# Patient Record
Sex: FEMALE | Race: WHITE | NOT HISPANIC OR LATINO | Employment: OTHER
[De-identification: names, ages, dates, MRNs, and addresses within clinical notes are randomized per-mention and may not be internally consistent; named-entity substitution may affect disease eponyms.]

---

## 2017-05-15 DIAGNOSIS — I10 BENIGN ESSENTIAL HYPERTENSION: ICD-10-CM

## 2017-05-15 NOTE — TELEPHONE ENCOUNTER
metoprolol (LOPRESSOR) 25 MG tablet      Last Written Prescription Date: 8/15/16  Last Fill Quantity: 180, # refills: 1    Last Office Visit with G, [facility] or [facility] prescribing provider:  5/20/16   Future Office Visit:        BP Readings from Last 3 Encounters:   05/20/16 139/90   12/23/15 142/88   10/19/15 (!) 129/94

## 2017-05-19 RX ORDER — METOPROLOL TARTRATE 25 MG/1
TABLET, FILM COATED ORAL
Qty: 60 TABLET | Refills: 0 | Status: SHIPPED | OUTPATIENT
Start: 2017-05-19 | End: 2017-07-21

## 2017-05-23 NOTE — TELEPHONE ENCOUNTER
Left detailed message as directed below and when called please schedule office visit. LUISA Barahona

## 2017-06-23 ENCOUNTER — HEALTH MAINTENANCE LETTER (OUTPATIENT)
Age: 37
End: 2017-06-23

## 2017-07-21 DIAGNOSIS — I10 BENIGN ESSENTIAL HYPERTENSION: ICD-10-CM

## 2017-07-21 RX ORDER — METOPROLOL TARTRATE 25 MG/1
TABLET, FILM COATED ORAL
Qty: 20 TABLET | Refills: 0 | Status: SHIPPED | OUTPATIENT
Start: 2017-07-21 | End: 2017-09-20

## 2017-07-21 NOTE — TELEPHONE ENCOUNTER
Routing refill request to provider for review/approval because:  Kylah given x1 and patient did not follow up, please advise.  No future appts noted at this time.        Alana Malcolm RN  [facility]

## 2017-07-21 NOTE — TELEPHONE ENCOUNTER
metoprolol (LOPRESSOR) 25 MG tablet      Last Written Prescription Date: 5/19/17  Last Fill Quantity: 60, # refills: 0    Last Office Visit with G, P or [facility] prescribing provider:  5/20/16   Future Office Visit:        BP Readings from Last 3 Encounters:   05/20/16 139/90   12/23/15 142/88   10/19/15 (!) 129/94

## 2017-09-20 ENCOUNTER — OFFICE VISIT (OUTPATIENT)
Dept: FAMILY MEDICINE | Facility: CLINIC | Age: 37
End: 2017-09-20
Payer: COMMERCIAL

## 2017-09-20 VITALS
DIASTOLIC BLOOD PRESSURE: 93 MMHG | BODY MASS INDEX: 37.45 KG/M2 | HEART RATE: 96 BPM | TEMPERATURE: 98.1 F | HEIGHT: 66 IN | SYSTOLIC BLOOD PRESSURE: 148 MMHG | WEIGHT: 233 LBS

## 2017-09-20 DIAGNOSIS — I10 BENIGN ESSENTIAL HYPERTENSION: ICD-10-CM

## 2017-09-20 DIAGNOSIS — R87.810 CERVICAL HIGH RISK HPV (HUMAN PAPILLOMAVIRUS) TEST POSITIVE: ICD-10-CM

## 2017-09-20 DIAGNOSIS — Z00.00 ROUTINE GENERAL MEDICAL EXAMINATION AT A HEALTH CARE FACILITY: Primary | ICD-10-CM

## 2017-09-20 DIAGNOSIS — Z23 NEED FOR PROPHYLACTIC VACCINATION AND INOCULATION AGAINST INFLUENZA: ICD-10-CM

## 2017-09-20 LAB
ANION GAP SERPL CALCULATED.3IONS-SCNC: 9 MMOL/L (ref 3–14)
BUN SERPL-MCNC: 11 MG/DL (ref 7–30)
CALCIUM SERPL-MCNC: 8.9 MG/DL (ref 8.5–10.1)
CHLORIDE SERPL-SCNC: 107 MMOL/L (ref 94–109)
CHOLEST SERPL-MCNC: 236 MG/DL
CO2 SERPL-SCNC: 23 MMOL/L (ref 20–32)
CREAT SERPL-MCNC: 0.8 MG/DL (ref 0.52–1.04)
GFR SERPL CREATININE-BSD FRML MDRD: 80 ML/MIN/1.7M2
GLUCOSE SERPL-MCNC: 111 MG/DL (ref 70–99)
HDLC SERPL-MCNC: 56 MG/DL
LDLC SERPL CALC-MCNC: 141 MG/DL
NONHDLC SERPL-MCNC: 180 MG/DL
POTASSIUM SERPL-SCNC: 4 MMOL/L (ref 3.4–5.3)
SODIUM SERPL-SCNC: 139 MMOL/L (ref 133–144)
TRIGL SERPL-MCNC: 197 MG/DL

## 2017-09-20 PROCEDURE — 87624 HPV HI-RISK TYP POOLED RSLT: CPT | Performed by: FAMILY MEDICINE

## 2017-09-20 PROCEDURE — 80061 LIPID PANEL: CPT | Performed by: FAMILY MEDICINE

## 2017-09-20 PROCEDURE — 80048 BASIC METABOLIC PNL TOTAL CA: CPT | Performed by: FAMILY MEDICINE

## 2017-09-20 PROCEDURE — 88142 CYTOPATH C/V THIN LAYER: CPT | Performed by: FAMILY MEDICINE

## 2017-09-20 PROCEDURE — 36415 COLL VENOUS BLD VENIPUNCTURE: CPT | Performed by: FAMILY MEDICINE

## 2017-09-20 PROCEDURE — 99395 PREV VISIT EST AGE 18-39: CPT | Mod: 25 | Performed by: FAMILY MEDICINE

## 2017-09-20 PROCEDURE — 90471 IMMUNIZATION ADMIN: CPT | Performed by: FAMILY MEDICINE

## 2017-09-20 PROCEDURE — 88141 CYTOPATH C/V INTERPRET: CPT | Performed by: FAMILY MEDICINE

## 2017-09-20 PROCEDURE — 99213 OFFICE O/P EST LOW 20 MIN: CPT | Mod: 25 | Performed by: FAMILY MEDICINE

## 2017-09-20 PROCEDURE — 90686 IIV4 VACC NO PRSV 0.5 ML IM: CPT | Performed by: FAMILY MEDICINE

## 2017-09-20 RX ORDER — METOPROLOL TARTRATE 25 MG/1
TABLET, FILM COATED ORAL
Qty: 90 TABLET | Refills: 1 | Status: SHIPPED | OUTPATIENT
Start: 2017-09-20 | End: 2017-12-29

## 2017-09-20 NOTE — PROGRESS NOTES
SUBJECTIVE:   CC: Afua Galvan is an 37 year old woman who presents for preventive health visit.     Physical   Annual:     Getting at least 3 servings of Calcium per day::  Yes    Bi-annual eye exam::  NO    Dental care twice a year::  NO    Sleep apnea or symptoms of sleep apnea::  None    Frequency of exercise::  2-3 days/week    Duration of exercise::  30-45 minutes    Taking medications regularly::  Yes    Medication side effects::  Not applicable    Additional concerns today::  YES      Today's PHQ-2 Score: PHQ-2 ( 1999 Pf[employer]) 9/19/2017   Q1: Little interest or pleasure in doing things 0   Q2: Feeling down, depressed or hopeless 0   PHQ-2 Score 0   Q1: Little interest or pleasure in doing things Not at all   Q2: Feeling down, depressed or hopeless Not at all   PHQ-2 Score 0       Abuse: Current or Past(Physical, Sexual or Emotional)- no   Do you feel safe in your environment - Yes    Social History   Substance Use Topics     Smoking status: Never Smoker     Smokeless tobacco: Never Used     Alcohol use 0.0 oz/week     0 Standard drinks or equivalent per week     The patient does not drink >3 drinks per day nor >7 drinks per week.    Reviewed orders with patient.  Reviewed health maintenance and updated orders accordingly - Yes  Labs reviewed in EPIC  BP Readings from Last 3 Encounters:   09/20/17 (!) 146/98   05/20/16 139/90   12/23/15 142/88    Wt Readings from Last 3 Encounters:   09/20/17 233 lb (105.7 kg)   05/20/16 217 lb (98.4 kg)   12/23/15 214 lb 8 oz (97.3 kg)                  Current Outpatient Prescriptions   Medication Sig Dispense Refill     metoprolol (LOPRESSOR) 25 MG tablet TAKE 1 TABLET BY MOUTH TWICE A DAY 20 tablet 0     order for DME Equipment being ordered: blood pressure changing kit. 1 Device 0     LORazepam (ATIVAN) 0.5 MG tablet Take 0.5 tablets (0.25 mg) by mouth every 6 hours as needed for anxiety 15 tablet 0     CINNAMON PO Take 500 mg by mouth daily       UNABLE TO FIND  "Tumeric 1 capsule orally daily.       Probiotic Product (PROBIOTIC DAILY PO) Take 1 capsule by mouth daily       Recent Labs   Lab Test  05/20/16   1246  10/13/15   1420  08/05/15   1108   LDL  154*   --    --    HDL  55   --    --    TRIG  181*   --    --    CR   --   0.70  0.82   GFRESTIMATED   --   >90  Non  GFR Calc    78   GFRESTBLACK   --   >90   GFR Calc    >90   GFR Calc     POTASSIUM   --   3.6  3.8   TSH  1.51   --    --             Mammogram not appropriate for this patient based on age.    Pertinent mammograms are reviewed under the imaging tab.  History of abnormal Pap smear: YES - updated in Problem List and Health Maintenance accordingly    Reviewed and updated as needed this visit by clinical staff         Reviewed and updated as needed this visit by Provider          ROS:  C: NEGATIVE for fever, chills, change in weight  I: NEGATIVE for worrisome rashes, moles or lesions  E: NEGATIVE for vision changes or irritation  ENT: NEGATIVE for ear, mouth and throat problems  R: NEGATIVE for significant cough or SOB  B: NEGATIVE for masses, tenderness or discharge  CV: NEGATIVE for chest pain, palpitations or peripheral edema  GI: NEGATIVE for nausea, abdominal pain, heartburn, or change in bowel habits  : NEGATIVE for unusual urinary or vaginal symptoms. Periods are regular.  M: NEGATIVE for significant arthralgias or myalgia  N: NEGATIVE for weakness, dizziness or paresthesias  P: NEGATIVE for changes in mood or affect     OBJECTIVE:   BP (!) 148/93  Pulse 96  Temp 98.1  F (36.7  C) (Oral)  Ht 5' 6\" (1.676 m)  Wt 233 lb (105.7 kg)  BMI 37.61 kg/m2  EXAM:  GENERAL: healthy, alert and no distress  EYES: Eyes grossly normal to inspection, PERRL and conjunctivae and sclerae normal  HENT: ear canals and TM's normal, nose and mouth without ulcers or lesions  NECK: no adenopathy, no asymmetry, masses, or scars and thyroid normal to palpation  RESP: lungs clear " to auscultation - no rales, rhonchi or wheezes  BREAST: normal without masses, tenderness or nipple discharge and no palpable axillary masses or adenopathy  CV: regular rate and rhythm, normal S1 S2, no S3 or S4, no murmur, click or rub, no peripheral edema and peripheral pulses strong  ABDOMEN: soft, nontender, no hepatosplenomegaly, no masses and bowel sounds normal   (female): normal female external genitalia, normal urethral meatus, vaginal mucosa pink, moist, well rugated, and normal cervix/adnexa/uterus without masses or discharge  MS: no gross musculoskeletal defects noted, no edema  SKIN: no suspicious lesions or rashes  NEURO: Normal strength and tone, mentation intact and speech normal  PSYCH: mentation appears normal, affect normal/bright    ASSESSMENT/PLAN:       ICD-10-CM    1. Routine general medical examination at a health care facility Z00.00 Lipid panel reflex to direct LDL     Basic metabolic panel  (Ca, Cl, CO2, Creat, Gluc, K, Na, BUN)   2. Cervical high risk HPV (human papillomavirus) test positive R87.810 Pap imaged thin layer diagnostic with HPV (select HPV order below)     HPV High Risk Types DNA Cervical   3. Benign essential hypertension I10 Basic metabolic panel  (Ca, Cl, CO2, Creat, Gluc, K, Na, BUN)   4. Need for prophylactic vaccination and inoculation against influenza Z23 FLU VAC, SPLIT VIRUS IM > 3 YO (QUADRIVALENT) [93167]     Vaccine Administration, Initial [99939]     Per pt she has BP kit at home and she checks her BP at home it is always less than 140/90s. She takes her metoprolol regularly w/o missing the dose.   Encouraged to monitor at home, f/u if it goes up. Encouraged regular exercise and healthy diet.   Awaiting labs.     COUNSELING:  Reviewed preventive health counseling, as reflected in patient instructions     reports that she has never smoked. She has never used smokeless tobacco.    Estimated body mass index is 34.5 kg/(m^2) as calculated from the following:     "Height as of 5/20/16: 5' 6.5\" (1.689 m).    Weight as of 5/20/16: 217 lb (98.4 kg).       Counseling Resources:  ATP IV Guidelines  Pooled Cohorts Equation Calculator  Breast Cancer Risk Calculator  FRAX Risk Assessment  ICSI Preventive Guidelines  Dietary Guidelines for Americans, 2010  USDA's MyPlate  ASA Prophylaxis  Lung CA Screening    Sakshi Carr MD  [facility]  Answers for HPI/ROS submitted by the patient on 9/19/2017   PHQ-2 Score: 0    "

## 2017-09-20 NOTE — PROGRESS NOTES
Injectable Influenza Immunization Documentation    1.  Is the person to be vaccinated sick today?   No    2. Does the person to be vaccinated have an allergy to a component   of the vaccine?   No    3. Has the person to be vaccinated ever had a serious reaction   to influenza vaccine in the past?   No    4. Has the person to be vaccinated ever had Guillain-Barré syndrome?   No    Form completed by Sheila Em MA

## 2017-09-20 NOTE — NURSING NOTE
"Chief Complaint   Patient presents with     Physical       Initial BP (!) 146/98  Pulse 93  Temp 98.1  F (36.7  C) (Oral)  Ht 5' 6\" (1.676 m)  Wt 233 lb (105.7 kg)  BMI 37.61 kg/m2 Estimated body mass index is 37.61 kg/(m^2) as calculated from the following:    Height as of this encounter: 5' 6\" (1.676 m).    Weight as of this encounter: 233 lb (105.7 kg).  Medication Reconciliation: complete  Sheila Em MA    "

## 2017-09-20 NOTE — MR AVS SNAPSHOT
After Visit Summary   9/20/2017    Afua Galvan    MRN: [id number]           Patient Information     Date Of Birth          1980        Visit Information        Provider Department      9/20/2017 9:00 AM Sakshi Carr MD [facility]        Today's Diagnoses     Routine general medical examination at a health care facility    -  1    Cervical high risk HPV (human papillomavirus) test positive        Benign essential hypertension          Care Instructions      Preventive Health Recommendations  Female Ages 26 - 39  Yearly exam:   See your health care provider every year in order to    Review health changes.     Discuss preventive care.      Review your medicines if you your doctor has prescribed any.    Until age 30: Get a Pap test every three years (more often if you have had an abnormal result).    After age 30: Talk to your doctor about whether you should have a Pap test every 3 years or have a Pap test with HPV screening every 5 years.   You do not need a Pap test if your uterus was removed (hysterectomy) and you have not had cancer.  You should be tested each year for STDs (sexually transmitted diseases), if you're at risk.   Talk to your provider about how often to have your cholesterol checked.  If you are at risk for diabetes, you should have a diabetes test (fasting glucose).  Shots: Get a flu shot each year. Get a tetanus shot every 10 years.   Nutrition:     Eat at least 5 servings of fruits and vegetables each day.    Eat whole-grain bread, whole-wheat pasta and brown rice instead of white grains and rice.    Talk to your provider about Calcium and Vitamin D.     Lifestyle    Exercise at least 150 minutes a week (30 minutes a day, 5 days of the week). This will help you control your weight and prevent disease.    Limit alcohol to one drink per day.    No smoking.     Wear sunscreen to prevent skin cancer.    See your dentist every six months for an  "exam and cleaning.            Follow-ups after your visit        Who to contact     If you have questions or need follow up information about today's clinic visit or your schedule please contact [facility] directly at [phone].  Normal or non-critical lab and imaging results will be communicated to you by MyChart, letter or phone within 4 business days after the clinic has received the results. If you do not hear from us within 7 days, please contact the clinic through [employer] or phone. If you have a critical or abnormal lab result, we will notify you by phone as soon as possible.  Submit refill requests through [employer] or call your pharmacy and they will forward the refill request to us. Please allow 3 business days for your refill to be completed.          Additional Information About Your Visit        [employer] Information     [employer] gives you secure access to your electronic health record. If you see a primary care provider, you can also send messages to your care team and make appointments. If you have questions, please call your primary care clinic.  If you do not have a primary care provider, please call [phone] and they will assist you.        Care EveryWhere ID     This is your Care EveryWhere ID. This could be used by other organizations to access your [place] medical records  [id number]        Your Vitals Were     Pulse Temperature Height BMI (Body Mass Index)          93 98.1  F (36.7  C) (Oral) 5' 6\" (1.676 m) 37.61 kg/m2         Blood Pressure from Last 3 Encounters:   09/20/17 (!) 146/98   05/20/16 139/90   12/23/15 142/88    Weight from Last 3 Encounters:   09/20/17 233 lb (105.7 kg)   05/20/16 217 lb (98.4 kg)   12/23/15 214 lb 8 oz (97.3 kg)              We Performed the Following     Basic metabolic panel  (Ca, Cl, CO2, Creat, Gluc, K, Na, BUN)     HPV High Risk Types DNA Cervical     Lipid panel reflex to direct LDL     Pap imaged thin layer diagnostic with HPV " (select HPV order below)        Primary Care Provider Office Phone #    [facility] [phone]       [address] [place] [id number]        Equal Access to Services     KIA COELLO : Hadii aad ku hadarjuan alberto Shelbiemartín, nidla bobbylev, mane kaaida castillo, moni dominiquein hayaan darvinkhurram berman marino carranza. [facility] [phone].    ATENCIÓN: Si habla español, tiene a mohr disposición servicios gratuitos de asistencia lingüística. Llame al [phone].    We comply with applicable federal civil rights laws and [place] laws. We do not discriminate on the basis of race, color, national origin, age, disability sex, sexual orientation or gender identity.            Thank you!     Thank you for choosing [facility]  for your care. Our goal is always to provide you with excellent care. Hearing back from our patients is one way we can continue to improve our services. Please take a few minutes to complete the written survey that you may receive in the mail after your visit with us. Thank you!             Your Updated Medication List - Protect others around you: Learn how to safely use, store and throw away your medicines at www.disposemymeds.org.          This list is accurate as of: 9/20/17 10:00 AM.  Always use your most recent med list.                   Brand Name Dispense Instructions for use Diagnosis    CINNAMON PO      Take 500 mg by mouth daily        LORazepam 0.5 MG tablet    ATIVAN    15 tablet    Take 0.5 tablets (0.25 mg) by mouth every 6 hours as needed for anxiety    Anxiety       metoprolol 25 MG tablet    LOPRESSOR    20 tablet    TAKE 1 TABLET BY MOUTH TWICE A DAY    Benign essential hypertension       order for DME     1 Device    Equipment being ordered: blood pressure changing kit.    Benign essential hypertension       PROBIOTIC DAILY PO      Take 1 capsule by mouth daily        UNABLE TO FIND      Tumeric 1 capsule orally daily.

## 2017-09-21 ENCOUNTER — MYC MEDICAL ADVICE (OUTPATIENT)
Dept: FAMILY MEDICINE | Facility: CLINIC | Age: 37
End: 2017-09-21

## 2017-09-21 DIAGNOSIS — L81.9 HYPERPIGMENTED SKIN LESION: Primary | ICD-10-CM

## 2017-09-21 NOTE — PROGRESS NOTES
Vic Galvan,     # Your sodium, potassium, kidney function are normal.     # Fasting glucose level is higher than normal, not in diabetic range though, limit sweets in your diet.     # cholesterol is unchanged from last check. I recommend healthy diet and regular exercise as below to improve cholesterol levels.     1. Eat a healthy diet.  A high amount of saturated fat and cholesterol in food that you eat can increase blood cholesterol.   saturated fat can be found in the following foods: Limit intake of  High-fat cheeses   High-fat cuts of meat   Whole-fat milk and cream   Butter   Ice cream and ice cream products.  Most of the fat that you eat should come from unsaturated sources:   Include:  * Nuts:  Walnuts,  Flaxseed                     * Vegetable oils , Canola oil , Olive oil, Sunflower oil.                     * Fish:trout, herring, and salmon                    *[place].   2. Maintain a healthy weight. Being overweight can increase your cholesterol level. Losing weight can help lower your LDL (bad) cholesterol and total cholesterol level, and raise your HDL (good) cholesterol level.     3. Exercise regularly. Regular physical activity can help lower LDL (bad) cholesterol and raise HDL (good) cholesterol. You should try to be physically active for 2 hours and 30 minutes (150 minutes) each week.       # Regarding your high blood pressure, I want you to check your BP at home at least once a day for next 2 weeks. Keep log of your BP numbers. Follow up with the nurse for BP recheck, bring your BP machine with you during that visit.   I will get back to you once you are seen by the RN.     You can schedule this nurse only visit for BP check by calling [phone].     Let me know if you have any further questions.     Sakshi Carr MD.   Family Physician.  [facility].

## 2017-09-27 LAB
COPATH REPORT: ABNORMAL
PAP: ABNORMAL

## 2017-09-28 PROBLEM — R87.810 ASCUS WITH POSITIVE HIGH RISK HPV CERVICAL: Status: ACTIVE | Noted: 2017-09-28

## 2017-09-28 PROBLEM — R87.610 ASCUS WITH POSITIVE HIGH RISK HPV CERVICAL: Status: ACTIVE | Noted: 2017-09-28

## 2017-09-28 LAB
FINAL DIAGNOSIS: ABNORMAL
HPV HR 12 DNA CVX QL NAA+PROBE: POSITIVE
HPV16 DNA SPEC QL NAA+PROBE: NEGATIVE
HPV18 DNA SPEC QL NAA+PROBE: NEGATIVE
SPECIMEN DESCRIPTION: ABNORMAL

## 2017-10-27 ENCOUNTER — TELEPHONE (OUTPATIENT)
Dept: OBGYN | Facility: CLINIC | Age: 37
End: 2017-10-27

## 2017-10-27 NOTE — TELEPHONE ENCOUNTER
Patient called to schedule a colposcopy.  This was suggested by family practice noted at her recent physical.  Scheduled her with Dr. Funez in  on 11/9/2017.  LMP was 10/26/2017.  She is aware of no unprotected intercourse 10 days prior and no intercourse 24 hours prior to the procedure.  She was advised to take 400 mg Ibuprofen 1 hour prior to her appointment.  All questions were answered.  Bhavani Keene RN

## 2017-11-09 ENCOUNTER — OFFICE VISIT (OUTPATIENT)
Dept: OBGYN | Facility: CLINIC | Age: 37
End: 2017-11-09
Payer: COMMERCIAL

## 2017-11-09 VITALS
BODY MASS INDEX: 38.58 KG/M2 | HEART RATE: 99 BPM | SYSTOLIC BLOOD PRESSURE: 171 MMHG | WEIGHT: 239 LBS | OXYGEN SATURATION: 97 % | DIASTOLIC BLOOD PRESSURE: 93 MMHG

## 2017-11-09 DIAGNOSIS — R87.810 ASCUS WITH POSITIVE HIGH RISK HPV CERVICAL: ICD-10-CM

## 2017-11-09 DIAGNOSIS — R87.610 ASCUS WITH POSITIVE HIGH RISK HPV CERVICAL: ICD-10-CM

## 2017-11-09 PROCEDURE — 99203 OFFICE O/P NEW LOW 30 MIN: CPT | Mod: 25 | Performed by: OBSTETRICS & GYNECOLOGY

## 2017-11-09 PROCEDURE — 88305 TISSUE EXAM BY PATHOLOGIST: CPT | Performed by: OBSTETRICS & GYNECOLOGY

## 2017-11-09 PROCEDURE — 57454 BX/CURETT OF CERVIX W/SCOPE: CPT | Performed by: OBSTETRICS & GYNECOLOGY

## 2017-11-09 NOTE — NURSING NOTE
"Chief Complaint   Patient presents with     Consult     Abnormal papsmear per Dr. Carr     Colposcopy     ASC-US and HPV+ other       Initial BP (!) 171/93 (BP Location: Left arm, Cuff Size: Adult Regular)  Pulse 99  Wt 108.4 kg (239 lb)  LMP 10/26/2017  SpO2 97%  Breastfeeding? No  BMI 38.58 kg/m2 Estimated body mass index is 38.58 kg/(m^2) as calculated from the following:    Height as of 9/20/17: 1.676 m (5' 6\").    Weight as of this encounter: 108.4 kg (239 lb).  Medication Reconciliation: complete   JARROD Littlejohn 11/9/2017         "

## 2017-11-09 NOTE — PROGRESS NOTES
Patient Name: Afua Galvan              Date: 11/9/2017   YOB: 1980                         Age: 37 year old   Phone: [phone] (home) [phone] (work)  ________________________________________________________________________  I have been asked to see Afua in consultation by Dr. Carr  to discuss the pap smear, findings and possible further evaluation.  The patient's pap smear history is as noted:  5/20/16 NIL/+ HR HPV (not 16 or 18). Plan: Cotest in 1 year.   9/20/17 ASCUS, +HR HPV.   I attempted to ensure that the patient was educated regarding the nature of her findings and implications to date.  We reviewed the role of HPV, incidence in the population and the natural history of the infection, and its transmission.  We also reviewed ways to minimize her future risk, the effect of HPV on the cervix and treatment options available, should they be indicated.    The pathophysiology of the cervix, including a discussion of the squamous and columnar cells, metaplasia and dysplasia have been reviewed, drawings, sketches and the pamphlets were reviewed with her.      Patient's last menstrual period was 10/26/2017.  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       Past Surgical History:   Procedure Laterality Date     COMBINED CYSTOSCOPY, RETROGRADES, URETEROSCOPY, INSERT STENT Right 8/10/2015    Procedure: COMBINED CYSTOSCOPY, RETROGRADES, URETEROSCOPY, INSERT  STENT;  Surgeon: Radha Garcia MD;  Location: [facility] OR     LASER HOLMIUM LITHOTRIPSY URETER(S), INSERT STENT, COMBINED Right 10/19/2015    Procedure: COMBINED CYSTOSCOPY, URETEROSCOPY, LASER HOLMIUM LITHOTRIPSY URETER(S), INSERT STENT;  Surgeon: aRdha Garcia MD;  Location: [facility] OR        Outpatient Encounter Prescriptions as of 11/9/2017   Medication Sig Dispense Refill     metoprolol (LOPRESSOR) 25 MG tablet TAKE 1 TABLET BY MOUTH TWICE A DAY 90 tablet 1     order for DME Equipment being ordered: blood pressure changing kit. 1 Device 0     LORazepam (ATIVAN) 0.5 MG tablet Take 0.5 tablets (0.25 mg) by mouth every 6 hours as needed for anxiety 15 tablet 0     CINNAMON PO Take 500 mg by mouth daily       UNABLE TO FIND Tumeric 1 capsule orally daily.       Probiotic Product (PROBIOTIC DAILY PO) Take 1 capsule by mouth daily       No facility-administered encounter medications on file as of 11/9/2017.         Allergies as of 11/09/2017     (No Known Allergies)       Social History     Social History     Marital status: Single     Spouse name: N/A     Number of children: N/A     Years of education: N/A     Social History Main Topics     Smoking status: Never Smoker     Smokeless tobacco: Never Used     Alcohol use 0.0 oz/week     0 Standard drinks or equivalent per week     Drug use: No     Sexual activity: Yes     Partners: Male     Birth control/ protection: Condom     Other Topics Concern     None     Social History Narrative        Family History   Problem Relation Age of Onset     DIABETES No family hx of      Coronary Artery Disease No family hx of      Hypertension No family hx of      Hyperlipidemia No family hx of      CEREBROVASCULAR DISEASE No family hx of      Breast Cancer No family hx of      Colon Cancer No family hx of      Prostate Cancer No family hx of      Other Cancer No family hx of      Depression No family hx of      Anxiety Disorder No family hx of      MENTAL ILLNESS No  family hx of      Substance Abuse No family hx of      Anesthesia Reaction No family hx of      Asthma No family hx of      OSTEOPOROSIS No family hx of      Genetic Disorder No family hx of      Thyroid Disease No family hx of      Obesity No family hx of          Review Of Systems  10 point ROS of systems including Constitutional, Eyes, Respiratory, Cardiovascular, Gastroenterology, Genitourinary, Integumentary, Muscularskeletal, Psychiatric were all negative except for pertinent positives noted in my HPI and in the PMH.      Exam:   BP (!) 171/93 (BP Location: Left arm, Cuff Size: Adult Regular)  Pulse 99  Wt 108.4 kg (239 lb)  LMP 10/26/2017  SpO2 97%  Breastfeeding? No  BMI 38.58 kg/m2  GENERAL:  WNWD female NAD  HEENT: NC/AT, EOMI  Lungs:  Good respiratory effort   SKIN: normal skin turgor  GAIT: Normal  NECK: Symmetrical, no masses noted   VULVA: Normal Genitalia  BUS: Normal  URETHRA:  No hypermobility noted  URETHRAL MEATUS:  No masses noted  VAGINA: Normal mucosa, no discharge  CERVIX: Closed, mobile, no discharge  PERIANAL:  No masses or lesions seen  EXTREMITIES: no clubbing, cyanosis, or edema    Assessment:  ASCUS pap smear  High risk HPV    Plan:  Recommend to Proceed with Colpo  The details of the colposcopic procedure were reviewed, the risks of missed diagnoses, pain, infection, and bleeding.       Lauro Funez MD        Procedure:  Procedure for colposcopy and biopsy has been explained to the patient and consent obtained.    Before the procedure, it was ensured that the patient was educated regarding the nature of her findings and implications to date.  We reviewed the role of HPV and the natural history of the infection.  We also reviewed ways to minimize her future risk, the effect of HPV on the cervix and treatment options available, should they be indicated.    The pathophysiology of the cervix, including a discussion of the squamous and columnar cells, metaplasia and dysplasia have  been reviewed, drawings, sketches and the pamphlets were reviewed with her.  The details of the colposcopic procedure were reviewed, the risks of missed diagnoses, pain, infection, and bleeding.  Questions seemed to be answered before proceeding and the patient then consented to the procedure.     Speculum placed in vagina and excellent visualization of cervix achieved, cervix swabbed  with acetic acid solution.    biopsies taken (including ECC): 2  Hemostasis effected with Silver Nitrate    Findings:  Cervix: no visible lesions and no concerning findings  Vaginal inspection: normal without visible lesions.  Procedure Summary: Patient tolerated procedure well and colposcopy adequate.      Assessment:   ASCUS pap smear and high risk HPV    Plan:  Specimens labelled and sent to pathology.  Will base further treatment on pathology findings.  Post biopsy instructions given to patient and call to discuss Pathology results.    Lauro Funez MD

## 2017-11-09 NOTE — MR AVS SNAPSHOT
After Visit Summary   11/9/2017    Afua Galvan    MRN: [id number]           Patient Information     Date Of Birth          1980        Visit Information        Provider Department      11/9/2017 8:30 AM aLuro Funez MD; PROC RM 1 WOMENS [facility]        Today's Diagnoses     ASCUS with positive high risk HPV cervical           Follow-ups after your visit        Who to contact     If you have questions or need follow up information about today's clinic visit or your schedule please contact [facility] directly at [phone].  Normal or non-critical lab and imaging results will be communicated to you by [employer], letter or phone within 4 business days after the clinic has received the results. If you do not hear from us within 7 days, please contact the clinic through [employer] or phone. If you have a critical or abnormal lab result, we will notify you by phone as soon as possible.  Submit refill requests through [employer] or call your pharmacy and they will forward the refill request to us. Please allow 3 business days for your refill to be completed.          Additional Information About Your Visit        MyChart Information     [employer] gives you secure access to your electronic health record. If you see a primary care provider, you can also send messages to your care team and make appointments. If you have questions, please call your primary care clinic.  If you do not have a primary care provider, please call [phone] and they will assist you.        Care EveryWhere ID     This is your Care EveryWhere ID. This could be used by other organizations to access your [facility] medical records  [id number]        Your Vitals Were     Pulse Last Period Pulse Oximetry Breastfeeding? BMI (Body Mass Index)       99 10/26/2017 97% No 38.58 kg/m2        Blood Pressure from Last 3 Encounters:   11/09/17 (!) 171/93   09/20/17 (!) 148/93   05/20/16 139/90     Weight from Last 3 Encounters:   11/09/17 108.4 kg (239 lb)   09/20/17 105.7 kg (233 lb)   05/20/16 98.4 kg (217 lb)              We Performed the Following     COLP CERVIX/UPPER VAGINA     Surgical pathology exam        Primary Care Provider Office Phone # Fax #    [facility] [phone]       [address] [place] [address]        Equal Access to Services     KIA COELLO : Hadii aad ku hadasho Soomaali, waaxda luqadaha, qaybta kaalmada adeegyada, waxay idiin hayaan harish mancinimarquesjerod carranza. So [facility] [phone].    ATENCIÓN: Si habla español, tiene a mohr disposición servicios gratuitos de asistencia lingüística. Llame al [phone].    We comply with applicable federal civil rights laws and [place] laws. We do not discriminate on the basis of race, color, national origin, age, disability, sex, sexual orientation, or gender identity.            Thank you!     Thank you for choosing [facility]  for your care. Our goal is always to provide you with excellent care. Hearing back from our patients is one way we can continue to improve our services. Please take a few minutes to complete the written survey that you may receive in the mail after your visit with us. Thank you!             Your Updated Medication List - Protect others around you: Learn how to safely use, store and throw away your medicines at www.disposemymeds.org.          This list is accurate as of: 11/9/17  9:41 AM.  Always use your most recent med list.                   Brand Name Dispense Instructions for use Diagnosis    CINNAMON PO      Take 500 mg by mouth daily        LORazepam 0.5 MG tablet    ATIVAN    15 tablet    Take 0.5 tablets (0.25 mg) by mouth every 6 hours as needed for anxiety    Anxiety       metoprolol 25 MG tablet    LOPRESSOR    90 tablet    TAKE 1 TABLET BY MOUTH TWICE A DAY    Benign essential hypertension       order for DME     1 Device    Equipment being  ordered: blood pressure changing kit.    Benign essential hypertension       PROBIOTIC DAILY PO      Take 1 capsule by mouth daily        UNABLE TO FIND      Tumeric 1 capsule orally daily.

## 2017-11-13 LAB — COPATH REPORT: NORMAL

## 2017-12-29 ENCOUNTER — TELEPHONE (OUTPATIENT)
Dept: FAMILY MEDICINE | Facility: CLINIC | Age: 37
End: 2017-12-29

## 2017-12-29 DIAGNOSIS — I10 BENIGN ESSENTIAL HYPERTENSION: ICD-10-CM

## 2018-01-02 RX ORDER — METOPROLOL TARTRATE 25 MG/1
TABLET, FILM COATED ORAL
Qty: 60 TABLET | Refills: 0 | Status: SHIPPED | OUTPATIENT
Start: 2018-01-02 | End: 2018-01-31

## 2018-01-02 NOTE — TELEPHONE ENCOUNTER
Requested Prescriptions   Pending Prescriptions Disp Refills     metoprolol (LOPRESSOR) 25 MG tablet [Pharmacy Med Name: METOPROLOL TARTRATE 25 MG TAB] 90 tablet 1        Last Written Prescription Date:  9/20/17  Last Fill Quantity: 90,  # refills: 0   Last Office Visit with G, P or [facility] prescribing provider:  9/20/17   Future Office Visit:      Sig: TAKE 1 TABLET BY MOUTH TWICE A DAY    Beta-Blockers Protocol Failed    12/29/2017  6:14 PM       Failed - Blood pressure under 140/90    BP Readings from Last 3 Encounters:   11/09/17 (!) 171/93   09/20/17 (!) 148/93   05/20/16 139/90                Passed - Patient is age 6 or older       Passed - Recent or future visit with authorizing provider's specialty    Patient had office visit in the last year or has a visit in the next 30 days with authorizing provider.  See chart review.               See plan at 9/20/17 visit:    # Regarding your high blood pressure, I want you to check your BP at home at least once a day for next 2 weeks. Keep log of your BP numbers. Follow up with the nurse for BP recheck, bring your BP machine with you during that visit.   I will get back to you once you are seen by the RN.      You can schedule this nurse only visit for BP check by calling [phone].      Let me know if you have any further questions.      Sakshi Carr MD.   Family Physician.  [facility].       Did not see RN nor bring in home readings nor have home monitor checked.    Medication is being filled for 1 time refill only due to:  Patient needs to be seen because due for RN visit, check home cuff.     Encounter routed to team to reach out to patient to schedule patient for RN visit, patient to bring home cuff and home readings to that visit.    Maren Gan RN  [facility]

## 2018-01-02 NOTE — TELEPHONE ENCOUNTER
Did not see RN nor bring in home readings nor have home monitor checked.    Medication is being filled for 1 time refill only due to:  Patient needs to be seen because due for RN visit, check home cuff.     Encounter routed to team to reach out to patient to schedule patient for RN visit, patient to bring home cuff and home readings to that visit.    Maren Gan, RN  [facility]

## 2018-01-31 DIAGNOSIS — I10 BENIGN ESSENTIAL HYPERTENSION: ICD-10-CM

## 2018-01-31 NOTE — TELEPHONE ENCOUNTER
"Requested Prescriptions   Pending Prescriptions Disp Refills     metoprolol tartrate (LOPRESSOR) 25 MG tablet [Pharmacy Med Name: METOPROLOL TARTRATE 25 MG TAB] 60 tablet 0    Last Written Prescription Date:  1/2/18  Last Fill Quantity: 60,  # refills: 0   Last Office Visit with [facility] provider:  9/20/17   Future Office Visit:      Sig: TAKE 1 TABLET BY MOUTH TWICE A DAY    Beta-Blockers Protocol Failed    1/31/2018 11:06 AM       Failed - Blood pressure under 140/90    BP Readings from Last 3 Encounters:   11/09/17 (!) 171/93   09/20/17 (!) 148/93   05/20/16 139/90                Passed - Patient is age 6 or older       Passed - Recent or future visit with authorizing provider's specialty    Patient had office visit in the last year or has a visit in the next 30 days with authorizing provider.  See \"Patient Info\" tab in inbasket, or \"Choose Columns\" in Meds & Orders section of the refill encounter.               "

## 2018-02-01 NOTE — TELEPHONE ENCOUNTER
Routing refill request to provider for review/approval because:  Patient needs to be seen because:  Last OV notes:    # Regarding your high blood pressure, I want you to check your BP at home at least once a day for next 2 weeks. Keep log of your BP numbers. Follow up with the nurse for BP recheck, bring your BP machine with you during that visit.   I will get back to you once you are seen by the RN.      Patient has not followed up.    Radha Mina, RN [facility] Triage.

## 2018-02-02 RX ORDER — METOPROLOL TARTRATE 25 MG/1
TABLET, FILM COATED ORAL
Qty: 30 TABLET | Refills: 0 | Status: SHIPPED | OUTPATIENT
Start: 2018-02-02 | End: 2018-02-16

## 2018-02-02 NOTE — TELEPHONE ENCOUNTER
30 pills approved, pt needs to be seen for BP recheck.     Sakshi Carr MD.   Family Physician.  [facility].

## 2018-02-16 ENCOUNTER — TELEPHONE (OUTPATIENT)
Dept: FAMILY MEDICINE | Facility: CLINIC | Age: 38
End: 2018-02-16

## 2018-02-16 DIAGNOSIS — I10 BENIGN ESSENTIAL HYPERTENSION: ICD-10-CM

## 2018-02-16 RX ORDER — METOPROLOL TARTRATE 25 MG/1
TABLET, FILM COATED ORAL
Qty: 30 TABLET | Refills: 0 | Status: SHIPPED | OUTPATIENT
Start: 2018-02-16 | End: 2018-03-03

## 2018-02-16 NOTE — TELEPHONE ENCOUNTER
"Requested Prescriptions   Pending Prescriptions Disp Refills     metoprolol tartrate (LOPRESSOR) 25 MG tablet [Pharmacy Med Name: METOPROLOL TARTRATE 25 MG TAB] 30 tablet 0    Last Written Prescription Date:  2-2-18  Last Fill Quantity: 30,  # refills: 0   Last office visit: 9/20/2017 with prescribing provider:     Future Office Visit:     Sig: TAKE 1 TABLET BY MOUTH TWICE A DAY    Beta-Blockers Protocol Failed    2/16/2018  9:43 AM       Failed - Blood pressure under 140/90 in past 12 months    BP Readings from Last 3 Encounters:   11/09/17 (!) 171/93   09/20/17 (!) 148/93   05/20/16 139/90                Passed - Patient is age 6 or older       Passed - Recent or future visit with authorizing provider's specialty    Patient had office visit in the last year or has a visit in the next 30 days with authorizing provider.  See \"Patient Info\" tab in inbasket, or \"Choose Columns\" in Meds & Orders section of the refill encounter.               "

## 2018-02-16 NOTE — TELEPHONE ENCOUNTER
30 pills approved. Pt needs to see RN as recommended.     Sakshi Carr MD.   Family Physician.  [facility].

## 2018-02-16 NOTE — LETTER
Vic Caal,          Your Care Team has attempted to reach you multiple times regarding a recent refill request for your metoprolol tartrate. A one month refill has been provided at this time. You were advised at your office visit on 09/20/17 to follow up with the clinic nurse for a blood pressure recheck. You were advised to check your blood pressure at home at least once a day for 2 weeks and bring that log with to your appointment. They also want you to bring your blood pressure machine with to the appointment. Please contact the clinic to schedule an appointment at [phone].      Sincerely,    Sakshi MYERS

## 2018-02-16 NOTE — TELEPHONE ENCOUNTER
See plan at 9/20/17 visit:    # Regarding your high blood pressure, I want you to check your BP at home at least once a day for next 2 weeks. Keep log of your BP numbers. Follow up with the nurse for BP recheck, bring your BP machine with you during that visit.   I will get back to you once you are seen by the RN.     Routing refill request to provider for review/approval because:  Kylah given x1 and patient did not follow up, please advise    Maren Gan RN  [facility]

## 2018-03-03 DIAGNOSIS — I10 BENIGN ESSENTIAL HYPERTENSION: ICD-10-CM

## 2018-03-05 RX ORDER — METOPROLOL TARTRATE 25 MG/1
TABLET, FILM COATED ORAL
Qty: 30 TABLET | Refills: 0 | Status: SHIPPED | OUTPATIENT
Start: 2018-03-05 | End: 2018-03-18

## 2018-03-05 NOTE — TELEPHONE ENCOUNTER
Routing refill request to provider for review/approval because:  Elevated BP.    Radha Mina RN [facility] Triage.

## 2018-03-05 NOTE — TELEPHONE ENCOUNTER
"Requested Prescriptions   Pending Prescriptions Disp Refills     metoprolol tartrate (LOPRESSOR) 25 MG tablet [Pharmacy Med Name: METOPROLOL TARTRATE 25 MG TAB] 30 tablet 0    Last Written Prescription Date:  2/16/18  Last Fill Quantity: 30,  # refills: 0   Last office visit: 9/20/2017 with prescribing provider:     Future Office Visit:   Next 5 appointments (look out 90 days)     Mar 23, 2018  9:00 AM CDT   [facility] with Sakshi Carr MD   [facility]    [address] [place] [id number]   [phone]                  Sig: TAKE 1 TABLET BY MOUTH TWICE A DAY    Beta-Blockers Protocol Failed    3/3/2018 10:50 AM       Failed - Blood pressure under 140/90 in past 12 months    BP Readings from Last 3 Encounters:   11/09/17 (!) 171/93   09/20/17 (!) 148/93   05/20/16 139/90                Passed - Patient is age 6 or older       Passed - Recent (12 mo) or future (30 days) visit within the authorizing provider's specialty    Patient had office visit in the last year or has a visit in the next 30 days with authorizing provider.  See \"Patient Info\" tab in inbasket, or \"Choose Columns\" in Meds & Orders section of the refill encounter.               "

## 2018-03-05 NOTE — TELEPHONE ENCOUNTER
Refill approved for a month, pt needs to be seen before next refill request.     Sakshi Carr MD.   Family Physician.  [facility].

## 2018-03-18 DIAGNOSIS — I10 BENIGN ESSENTIAL HYPERTENSION: ICD-10-CM

## 2018-03-19 NOTE — TELEPHONE ENCOUNTER
"Requested Prescriptions   Pending Prescriptions Disp Refills     metoprolol tartrate (LOPRESSOR) 25 MG tablet [Pharmacy Med Name: METOPROLOL TARTRATE 25 MG TAB] 30 tablet 0    Last Written Prescription Date:  3/5/18  Last Fill Quantity: 30,  # refills: 0   Last office visit: 9/20/2017 with prescribing provider:     Future Office Visit:   Next 5 appointments (look out 90 days)     Mar 23, 2018  9:00 AM CDT   [facility] MD   [facility]    [address] [place] [id number]   [phone]                  Sig: TAKE 1 TABLET BY MOUTH TWICE A DAY    Beta-Blockers Protocol Failed    3/18/2018  2:07 PM       Failed - Blood pressure under 140/90 in past 12 months    BP Readings from Last 3 Encounters:   11/09/17 (!) 171/93   09/20/17 (!) 148/93   05/20/16 139/90                Passed - Patient is age 6 or older       Passed - Recent (12 mo) or future (30 days) visit within the authorizing provider's specialty    Patient had office visit in the last 12 months or has a visit in the next 30 days with authorizing provider or within the authorizing provider's specialty.  See \"Patient Info\" tab in inbasket, or \"Choose Columns\" in Meds & Orders section of the refill encounter.              "

## 2018-03-21 RX ORDER — METOPROLOL TARTRATE 25 MG/1
TABLET, FILM COATED ORAL
Qty: 15 TABLET | Refills: 0 | Status: SHIPPED | OUTPATIENT
Start: 2018-03-21 | End: 2022-07-23

## 2018-03-21 NOTE — TELEPHONE ENCOUNTER
15 pills approved, needs to be seen, elevated BP/     Sakshi Carr MD.   Family Physician.  [facility].

## 2018-11-16 ENCOUNTER — TELEPHONE (OUTPATIENT)
Dept: OBGYN | Facility: CLINIC | Age: 38
End: 2018-11-16

## 2018-11-16 NOTE — TELEPHONE ENCOUNTER
Pt is past due for Pap follow up  Reminder letter has been sent  LMTC her clinic with any questions or to schedule    Nneka Mariee,   Pap Tracking

## 2018-11-24 ENCOUNTER — HEALTH MAINTENANCE LETTER (OUTPATIENT)
Age: 38
End: 2018-11-24

## 2019-02-15 ENCOUNTER — HEALTH MAINTENANCE LETTER (OUTPATIENT)
Age: 39
End: 2019-02-15

## 2019-09-28 ENCOUNTER — HEALTH MAINTENANCE LETTER (OUTPATIENT)
Age: 39
End: 2019-09-28

## 2021-01-10 ENCOUNTER — HEALTH MAINTENANCE LETTER (OUTPATIENT)
Age: 41
End: 2021-01-10

## 2021-10-23 ENCOUNTER — HEALTH MAINTENANCE LETTER (OUTPATIENT)
Age: 41
End: 2021-10-23

## 2022-02-12 ENCOUNTER — HEALTH MAINTENANCE LETTER (OUTPATIENT)
Age: 42
End: 2022-02-12

## 2022-03-17 ENCOUNTER — PRE VISIT (OUTPATIENT)
Dept: UROLOGY | Facility: CLINIC | Age: 42
End: 2022-03-17
Payer: COMMERCIAL

## 2022-03-17 NOTE — TELEPHONE ENCOUNTER
Reason for visit: consult      Dx/Hx/Sx: nephrolithiasis     Records/imaging/labs/orders: CT report in epic    At Rooming: video visit     Action 03/18/2022 JTYV 4:02pm   Action Taken [facility] sent a request to [facility] for images to be pushed.      Action 03/21/2022 JTV 12:15pm   Action Taken [facility] sent a 2nd request to [facility] for images to be pushed.      Action 03/23/2022 JTV 9:27am   Action Taken [facility] received and resolved images from [facility].

## 2022-04-08 ENCOUNTER — VIRTUAL VISIT (OUTPATIENT)
Dept: UROLOGY | Facility: CLINIC | Age: 42
End: 2022-04-08
Payer: COMMERCIAL

## 2022-04-08 DIAGNOSIS — N20.0 KIDNEY STONE: Primary | ICD-10-CM

## 2022-04-08 PROCEDURE — 99203 OFFICE O/P NEW LOW 30 MIN: CPT | Mod: 95 | Performed by: NURSE PRACTITIONER

## 2022-04-08 RX ORDER — LOSARTAN POTASSIUM 50 MG/1
TABLET ORAL
COMMUNITY
Start: 2022-02-02 | End: 2022-07-23

## 2022-04-08 NOTE — NURSING NOTE
Chief Complaint   Patient presents with     Consult For     kidney stone, not sure if she has it anymore        not currently breastfeeding. There is no height or weight on file to calculate BMI.    Patient Active Problem List   Diagnosis     Calculus of right kidney     Xanthogranulomatous pyelonephritis     Anemia     Benign essential hypertension     Non morbid obesity due to excess calories     Cervical high risk HPV (human papillomavirus) test positive     ASCUS with positive high risk HPV cervical       No Known Allergies    Current Outpatient Medications   Medication Sig Dispense Refill     losartan (COZAAR) 50 MG tablet        CINNAMON PO Take 500 mg by mouth daily       LORazepam (ATIVAN) 0.5 MG tablet Take 0.5 tablets (0.25 mg) by mouth every 6 hours as needed for anxiety 15 tablet 0     metoprolol tartrate (LOPRESSOR) 25 MG tablet TAKE 1 TABLET BY MOUTH TWICE A DAY (Patient taking differently: 100 mg ) 15 tablet 0     order for DME Equipment being ordered: blood pressure changing kit. 1 Device 0     Probiotic Product (PROBIOTIC DAILY PO) Take 1 capsule by mouth daily       UNABLE TO FIND Tumeric 1 capsule orally daily.         Social History     Tobacco Use     Smoking status: Never Smoker     Smokeless tobacco: Never Used   Substance Use Topics     Alcohol use: Yes     Alcohol/week: 0.0 standard drinks     Drug use: No       Cindy Correa  4/8/2022  2:17 PM

## 2022-04-08 NOTE — LETTER
4/8/2022       RE: Afua Glavan  [address]  [facility] [address]     Dear Colleague,    Thank you for referring your patient, Afua Galvan, to the [facility] [place] [facility]. Please see a copy of my visit note below.    Afua is a 42 year old who is being evaluated via a billable video visit.      How would you like to obtain your AVS? MyChart  If the video visit is dropped, the invitation should be resent by: Text to cell phone: [phone]  Will anyone else be joining your video visit? No    Video Start Time: 2:30 PM  Video-Visit Details    Type of service:  Video Visit    Video End Time: 2:55 PM    Originating Location (pt. Location): Home    Distant Location (provider location):  [facility]     Platform used for Video Visit: Chacha Galvan complains of   Chief Complaint   Patient presents with     Consult For     kidney stone, not sure if she has it anymore        Assessment/Plan:  42 year old female with a history of HTN, HLD and kidney stones, with evidence of minimal right renal function in 2015. Much like in 2015, she has a large stone in her right UPJ, as well as in the lower pole of her right kidney, though is unclear if this is having any effect given her minimal kidney function on this side. She is no longer symptomatic like she was last month. We discussed stone treatment today, though this is likely not needed if this kidney has little to no function. She would likely benefit more from a nephrectomy than pursuing any stone removal.   -Will pursue an updated CT A/P now to get a new baseline of her right-sided stone burden to guide surgical planning.     Rochelle Younger, CNP  Department of Urology      Subjective:   42 year old female with a history of HTN, HLD and kidney stones who was seen in the ED last month for right flank pain. CT showed right-sided  stones, with a large stone in the inferior pole of the kidney measuring 2.5 cm, as well as a 1.5 cm stone in the renal pelvis. No ureteral stone, and no left-sided stones.     Per chart review, she previously saw my colleague, Dr. Garcia, for stones in 2015. At that time, she was found to have severe right hydronephrosis, a 1.9 cm lower pole stone, and inflammatory changes concerning for pyelonephritis vs. xanthogranulomatous disease. She underwent stent placement on 8/10/2015 and subsequently underwent a renogram that showed 4% function in the right kidney, 96% in the left. She was counseled on proceeding with a nephrectomy but insisted that endoscopic management be exhausted prior. Therefore, URS was performed on 10/19/15. The stone at her UPJ was noted to be partially-impacted. Lithotripsy was performed. Stone in her lower pole was apparently difficult to visualize due to edema.     Today, she states that she feels much better than she did when she was in the ED last month.       Objective:     Exam:   Patient is a 42 year old female   No vital signs obtained due to virtual visit.  General Appearance: Well groomed, hygenic  Respiratory: No cough, no respiratory distress or labored breathing  Musculoskeletal: Grossly normal with no gross deficits  Skin: No discoloration or apparent rashes  Neurologic: No tremors  Psychiatric: Alert and oriented  Further examination is deferred due to the nature of our visit.

## 2022-04-08 NOTE — PROGRESS NOTES
Afua is a 42 year old who is being evaluated via a billable video visit.      How would you like to obtain your AVS? MyChart  If the video visit is dropped, the invitation should be resent by: Text to cell phone: [phone]  Will anyone else be joining your video visit? No    Video Start Time: 2:30 PM  Video-Visit Details    Type of service:  Video Visit    Video End Time: 2:55 PM    Originating Location (pt. Location): Home    Distant Location (provider location):  [facility] UROLOGY [facility]     Platform used for Video Visit: Chacha Galvan complains of   Chief Complaint   Patient presents with     Consult For     kidney stone, not sure if she has it anymore        Assessment/Plan:  42 year old female with a history of HTN, HLD and kidney stones, with evidence of minimal right renal function in 2015. Much like in 2015, she has a large stone in her right UPJ, as well as in the lower pole of her right kidney, though is unclear if this is having any effect given her minimal kidney function on this side. She is no longer symptomatic like she was last month. We discussed stone treatment today, though this is likely not needed if this kidney has little to no function. She would likely benefit more from a nephrectomy than pursuing any stone removal.   -Will pursue an updated CT A/P now to get a new baseline of her right-sided stone burden to guide surgical planning.     Rochelle Younger, CNP  Department of Urology      Subjective:   42 year old female with a history of HTN, HLD and kidney stones who was seen in the ED last month for right flank pain. CT showed right-sided stones, with a large stone in the inferior pole of the kidney measuring 2.5 cm, as well as a 1.5 cm stone in the renal pelvis. No ureteral stone, and no left-sided stones.     Per chart review, she previously saw my colleague, Dr. Garcia, for stones in 2015. At that time, she was found to have severe right  hydronephrosis, a 1.9 cm lower pole stone, and inflammatory changes concerning for pyelonephritis vs. xanthogranulomatous disease. She underwent stent placement on 8/10/2015 and subsequently underwent a renogram that showed 4% function in the right kidney, 96% in the left. She was counseled on proceeding with a nephrectomy but insisted that endoscopic management be exhausted prior. Therefore, URS was performed on 10/19/15. The stone at her UPJ was noted to be partially-impacted. Lithotripsy was performed. Stone in her lower pole was apparently difficult to visualize due to edema.     Today, she states that she feels much better than she did when she was in the ED last month.       Objective:     Exam:   Patient is a 42 year old female   No vital signs obtained due to virtual visit.  General Appearance: Well groomed, hygenic  Respiratory: No cough, no respiratory distress or labored breathing  Musculoskeletal: Grossly normal with no gross deficits  Skin: No discoloration or apparent rashes  Neurologic: No tremors  Psychiatric: Alert and oriented  Further examination is deferred due to the nature of our visit.

## 2022-04-08 NOTE — PATIENT INSTRUCTIONS
UROLOGY CLINIC VISIT PATIENT INSTRUCTIONS    -Will obtain a repeat CT scan now to reevaluate the stones in your right kidney. To schedule this scan, please call [phone].   -I will be in touch with you regarding results, once completed.     If you have any issues, questions or concerns in the meantime, do not hesitate to contact us at [phone] or via [employer].     Rochelle Younger, CNP  Department of Urology

## 2022-04-13 ENCOUNTER — ANCILLARY PROCEDURE (OUTPATIENT)
Dept: CT IMAGING | Facility: CLINIC | Age: 42
End: 2022-04-13
Attending: NURSE PRACTITIONER
Payer: COMMERCIAL

## 2022-04-13 DIAGNOSIS — N20.0 KIDNEY STONE: ICD-10-CM

## 2022-04-13 PROCEDURE — 74176 CT ABD & PELVIS W/O CONTRAST: CPT | Mod: GC | Performed by: RADIOLOGY

## 2022-04-18 ENCOUNTER — TELEPHONE (OUTPATIENT)
Dept: UROLOGY | Facility: CLINIC | Age: 42
End: 2022-04-18
Payer: COMMERCIAL

## 2022-04-18 DIAGNOSIS — N20.0 KIDNEY STONES: Primary | ICD-10-CM

## 2022-04-18 NOTE — TELEPHONE ENCOUNTER
[facility]    Phone Message    May a detailed message be left on voicemail: yes     Reason for Call: Requesting Results   Name/type of test: CT scan  Date of test: 4/13/22  Was test done at a location other than [facility] (Please fill in the location if not [facility])?: No      Action Taken: Message routed to:  [facility] ([facility] Urology    Travel Screening: Not Applicable

## 2022-04-21 ENCOUNTER — HOSPITAL ENCOUNTER (OUTPATIENT)
Dept: NUCLEAR MEDICINE | Facility: CLINIC | Age: 42
Setting detail: NUCLEAR MEDICINE
Discharge: HOME | End: 2022-04-21
Attending: NURSE PRACTITIONER | Admitting: NURSE PRACTITIONER
Payer: COMMERCIAL

## 2022-04-21 DIAGNOSIS — N20.0 KIDNEY STONES: ICD-10-CM

## 2022-04-21 PROCEDURE — 250N000011 HC RX IP 250 OP 636: Performed by: NURSE PRACTITIONER

## 2022-04-21 PROCEDURE — 78708 K FLOW/FUNCT IMAGE W/DRUG: CPT

## 2022-04-21 PROCEDURE — A9562 TC99M MERTIATIDE: HCPCS | Performed by: NURSE PRACTITIONER

## 2022-04-21 PROCEDURE — 78708 K FLOW/FUNCT IMAGE W/DRUG: CPT | Mod: 26

## 2022-04-21 PROCEDURE — 343N000001 HC RX 343: Performed by: NURSE PRACTITIONER

## 2022-04-21 RX ORDER — FUROSEMIDE 10 MG/ML
20-40 INJECTION INTRAMUSCULAR; INTRAVENOUS ONCE
Status: COMPLETED | OUTPATIENT
Start: 2022-04-21 | End: 2022-04-21

## 2022-04-21 RX ADMIN — TECHNESCAN TC 99M MERTIATIDE 10.1 MILLICURIE: 1 INJECTION, POWDER, LYOPHILIZED, FOR SOLUTION INTRAVENOUS at 12:05

## 2022-04-21 RX ADMIN — FUROSEMIDE 20 MG: 10 INJECTION, SOLUTION INTRAVENOUS at 12:26

## 2022-04-26 ENCOUNTER — MYC MEDICAL ADVICE (OUTPATIENT)
Dept: UROLOGY | Facility: CLINIC | Age: 42
End: 2022-04-26
Payer: COMMERCIAL

## 2022-04-29 ENCOUNTER — PREP FOR PROCEDURE (OUTPATIENT)
Dept: UROLOGY | Facility: CLINIC | Age: 42
End: 2022-04-29

## 2022-04-29 ENCOUNTER — VIRTUAL VISIT (OUTPATIENT)
Dept: UROLOGY | Facility: CLINIC | Age: 42
End: 2022-04-29
Payer: COMMERCIAL

## 2022-04-29 DIAGNOSIS — N20.0 NEPHROLITHIASIS: Primary | ICD-10-CM

## 2022-04-29 DIAGNOSIS — N20.0 KIDNEY STONES: Primary | ICD-10-CM

## 2022-04-29 PROCEDURE — 99214 OFFICE O/P EST MOD 30 MIN: CPT | Mod: 95 | Performed by: UROLOGY

## 2022-04-29 RX ORDER — CIPROFLOXACIN 500 MG/1
500 TABLET, FILM COATED ORAL 2 TIMES DAILY
Qty: 28 TABLET | Refills: 0 | Status: SHIPPED | OUTPATIENT
Start: 2022-04-29 | End: 2022-05-13

## 2022-04-29 RX ORDER — CEFAZOLIN SODIUM 2 G/50ML
2 SOLUTION INTRAVENOUS
Status: CANCELLED | OUTPATIENT
Start: 2022-04-29

## 2022-04-29 RX ORDER — HEPARIN SODIUM 5000 [USP'U]/.5ML
5000 INJECTION, SOLUTION INTRAVENOUS; SUBCUTANEOUS
Status: CANCELLED | OUTPATIENT
Start: 2022-04-29

## 2022-04-29 RX ORDER — CEFAZOLIN SODIUM 2 G/50ML
2 SOLUTION INTRAVENOUS SEE ADMIN INSTRUCTIONS
Status: CANCELLED | OUTPATIENT
Start: 2022-04-29

## 2022-04-29 NOTE — LETTER
4/29/2022       RE: Afua Galvan  [address]  [facility] [address]     Dear Colleague,    Thank you for referring your patient, Afua Galvan, to the [facility] UROLOGY CLINIC [place] at [facility]. Please see a copy of my visit note below.      Urology Clinic    Assessment and Plan  Right kidney stones     8/10/15 Right ureteral stent with Dr Garcia      Right non functional kidney    4/21/22 LASIX RENOGRAM 3%      Urinary tract infection symptoms         The following are complicating factors.  These increase the risk of perioperative complications and make treatment more complicated.    Morbid obesity  BMI 39      We covered surgical risks which include but are not limited to heart attack, stroke, blood clot in the legs or lungs, death, injury to surrounding organs (intestine, liver, spleen, pancreas, lung, muscles, nerves), hernias, loss of sensation around incisions, decreased renal function, and infection.  We discussed the risks of blood transfusion including HIV and hepatitis.  Additional procedures may be necessary in the perioperative period. There are other additional unexpected outcomes which may occur.        Plan    Right robot nephrectomy.    [facility] ([facility] Clinic).    Cipro x 2weeks.      __________________________________________________    HPI  She had kidney stones in 2015.  The treatment for this was really traumatic and she had a urinary tract infection.    Recently her father passed away and she began having right flank pain and a fever.    She is having dysuria      4/21/22 Lasix Renogram   I reviewed the radiologic images and report from this radiologic exam.  My independent interpretation is:    Non functional right kidney    Radiologist Impression  1. Normal functioning left kidney with normal perfusion.  2. Right kidney is nonfunctioning and does not show perfusion  or  excretion.      4/13/22 CT Abdomen/Pelvis without contrast   I reviewed the radiologic images and report from this radiologic exam.  My independent interpretation is:    Multiple large right kidney stones.    Radiologist Impression  1. Multiple large obstructing renal calculi measuring up to 2.3 cm, in  the inferior and interpolar collecting system, source of the right  ureteropelvic junction, with severe hydronephrosis and cortical  atrophy, with perinephric fat stranding. Obstructing stones in the  setting of chronic xanthogranulomatous pyelonephritis.  2. Reactive retroperitoneal lymphadenopathy and inflammatory change.  3. Large pelvic/ovarian cysts suspected. When viewed on the left may  represent a hemorrhagic cyst with some layering internal density. No  adjacent fluid or stranding to suggest ruptured ovarian cyst. Could  consider elective follow-up ultrasound pelvis to better evaluate.    I reviewed the following labs  I reviewed the following laboratory data and went over findings with patient:  Recent Labs   Lab Test 10/13/15  1420 08/05/15  1108   WBC 6.9 9.9   HGB 11.7 8.7*    464*     Recent Labs   Lab Test 09/20/17  1009 10/13/15  1420 08/05/15  1108   CR 0.80 0.70 0.82   GFRESTIMATED 80 >90  Non  GFR Calc   78   GFRESTBLACK >90 >90   GFR Calc   >90   GFR Calc     * 102* 100*     Video-Visit Details  Video Start Time: 141  Video End Time: 206  Time spent on pre-visit work, post visit work, and documentation on day of visit outside of time during video visit: 2 min  Total time on the day of the visit: 27 min  Originating Location (pt. Location): Home.    Distant Location (provider location):  [facility].  Platform used for Video Visit: Kinetic Social  Patient Care Team:  [facility], [facility] as PCP - General (Clinic)  Radha Garcia MD as MD (Urology)  Self, MD Lawrence as Referring  Physician  Radha Garcia MD as MD (Urology)  Rochelle Younger CNP as Nurse Practitioner (Urology)  Rochelle Younger CNP as Assigned Surgical Provider      Copy to patient  FRANTZ CAMRYN MARSH  [address]  [facility] [address]

## 2022-04-29 NOTE — PROGRESS NOTES
"Afua is a 42 year old who is being evaluated via a billable video visit.      How would you like to obtain your AVS? MyChart  If the video visit is dropped, the invitation should be resent by: Text to cell phone: [phone]  Will anyone else be joining your video visit? No  {If patient encounters technical issues they should call [phone] :[id number]}    Video Start Time: {video visit start/end time for provider to select:152948}  Video-Visit Details    Type of service:  Video Visit    Video End Time:{video visit start/end time for provider to select:152948}    Originating Location (pt. Location): {video visit patient location:057234::\"Home\"}    Distant Location (provider location):  [facility] UROLOGY CLINIC [place]     Platform used for Video Visit: {Virtual Visit Platforms:179757::\"e-volo\"}    "

## 2022-04-29 NOTE — PROGRESS NOTES
Urology Clinic    Assessment and Plan  Right kidney stones     8/10/15 Right ureteral stent with Dr Garcia      Right non functional kidney    4/21/22 LASIX RENOGRAM 3%      Urinary tract infection symptoms         The following are complicating factors.  These increase the risk of perioperative complications and make treatment more complicated.    Morbid obesity  BMI 39      We covered surgical risks which include but are not limited to heart attack, stroke, blood clot in the legs or lungs, death, injury to surrounding organs (intestine, liver, spleen, pancreas, lung, muscles, nerves), hernias, loss of sensation around incisions, decreased renal function, and infection.  We discussed the risks of blood transfusion including HIV and hepatitis.  Additional procedures may be necessary in the perioperative period. There are other additional unexpected outcomes which may occur.        Plan    Right robot nephrectomy.    [facility] (PAC Clinic).    Cipro x 2weeks.      __________________________________________________    HPI  She had kidney stones in 2015.  The treatment for this was really traumatic and she had a urinary tract infection.    Recently her father passed away and she began having right flank pain and a fever.    She is having dysuria      4/21/22 Lasix Renogram   I reviewed the radiologic images and report from this radiologic exam.  My independent interpretation is:    Non functional right kidney    Radiologist Impression  1. Normal functioning left kidney with normal perfusion.  2. Right kidney is nonfunctioning and does not show perfusion or  excretion.      4/13/22 CT Abdomen/Pelvis without contrast   I reviewed the radiologic images and report from this radiologic exam.  My independent interpretation is:    Multiple large right kidney stones.    Radiologist Impression  1. Multiple large obstructing renal calculi measuring up to 2.3 cm, in  the inferior and  interpolar collecting system, source of the right  ureteropelvic junction, with severe hydronephrosis and cortical  atrophy, with perinephric fat stranding. Obstructing stones in the  setting of chronic xanthogranulomatous pyelonephritis.  2. Reactive retroperitoneal lymphadenopathy and inflammatory change.  3. Large pelvic/ovarian cysts suspected. When viewed on the left may  represent a hemorrhagic cyst with some layering internal density. No  adjacent fluid or stranding to suggest ruptured ovarian cyst. Could  consider elective follow-up ultrasound pelvis to better evaluate.    I reviewed the following labs  I reviewed the following laboratory data and went over findings with patient:  Recent Labs   Lab Test 10/13/15  1420 08/05/15  1108   WBC 6.9 9.9   HGB 11.7 8.7*    464*     Recent Labs   Lab Test 09/20/17  1009 10/13/15  1420 08/05/15  1108   CR 0.80 0.70 0.82   GFRESTIMATED 80 >90  Non  GFR Calc   78   GFRESTBLACK >90 >90   GFR Calc   >90   GFR Calc     * 102* 100*     Video-Visit Details  Video Start Time: 141  Video End Time: 206  Time spent on pre-visit work, post visit work, and documentation on day of visit outside of time during video visit: 2 min  Total time on the day of the visit: 27 min  Originating Location (pt. Location): Home.    Distant Location (provider location):  [facility].  Platform used for Video Visit: Yeeply Mobile  Patient Care Team:  [facility], [facility] as PCP - General (Clinic)  Radha Garcia MD as MD (Urology)  Self, Referred, MD as Referring Physician  Radha Garcia MD as MD (Urology)  Rochelle Younger CNP as Nurse Practitioner (Urology)  Rochelle Younger CNP as Assigned Surgical Provider      Copy to patient  FRANTZ MARSH  [address] [facility] [address]

## 2022-05-03 ENCOUNTER — MYC MEDICAL ADVICE (OUTPATIENT)
Dept: UROLOGY | Facility: CLINIC | Age: 42
End: 2022-05-03
Payer: COMMERCIAL

## 2022-05-09 ENCOUNTER — OFFICE VISIT (OUTPATIENT)
Dept: FAMILY MEDICINE | Facility: CLINIC | Age: 42
End: 2022-05-09
Payer: COMMERCIAL

## 2022-05-09 VITALS
OXYGEN SATURATION: 98 % | HEIGHT: 67 IN | DIASTOLIC BLOOD PRESSURE: 89 MMHG | BODY MASS INDEX: 36.41 KG/M2 | TEMPERATURE: 98.4 F | HEART RATE: 76 BPM | WEIGHT: 232 LBS | SYSTOLIC BLOOD PRESSURE: 142 MMHG

## 2022-05-09 DIAGNOSIS — Z00.00 HEALTHCARE MAINTENANCE: Primary | ICD-10-CM

## 2022-05-09 DIAGNOSIS — Z76.89 ENCOUNTER TO ESTABLISH CARE: ICD-10-CM

## 2022-05-09 DIAGNOSIS — F41.9 ANXIETY: ICD-10-CM

## 2022-05-09 DIAGNOSIS — E66.01 MORBID OBESITY (H): ICD-10-CM

## 2022-05-09 DIAGNOSIS — N20.0 KIDNEY STONES: ICD-10-CM

## 2022-05-09 LAB
ALBUMIN UR-MCNC: NEGATIVE MG/DL
APPEARANCE UR: CLEAR
BACTERIA #/AREA URNS HPF: ABNORMAL /HPF
BILIRUB UR QL STRIP: NEGATIVE
COLOR UR AUTO: ABNORMAL
GLUCOSE UR STRIP-MCNC: NEGATIVE MG/DL
HGB UR QL STRIP: ABNORMAL
KETONES UR STRIP-MCNC: NEGATIVE MG/DL
LEUKOCYTE ESTERASE UR QL STRIP: NEGATIVE
NITRATE UR QL: NEGATIVE
PH UR STRIP: 6.5 [PH] (ref 5–7)
RBC URINE: <1 /HPF
SP GR UR STRIP: 1 (ref 1–1.03)
SQUAMOUS EPITHELIAL: 1 /HPF
UROBILINOGEN UR STRIP-MCNC: NORMAL MG/DL
WBC URINE: 4 /HPF

## 2022-05-09 PROCEDURE — 81001 URINALYSIS AUTO W/SCOPE: CPT | Performed by: UROLOGY

## 2022-05-09 RX ORDER — LORAZEPAM 0.5 MG/1
0.25 TABLET ORAL EVERY 6 HOURS PRN
Qty: 15 TABLET | Refills: 0 | Status: SHIPPED | OUTPATIENT
Start: 2022-05-09 | End: 2022-07-22

## 2022-05-09 NOTE — NURSING NOTE
"ROOM:1  MINDI NIETO    Preferred Name: Afua     42 year old  Chief Complaint   Patient presents with     kidney infection        Blood pressure (!) 142/89, pulse 76, temperature 98.4  F (36.9  C), temperature source Oral, height 1.702 m (5' 7\"), weight 105.2 kg (232 lb), SpO2 98 %, not currently breastfeeding. Body mass index is 36.34 kg/m .  BP completed using cuff size:    Patient Active Problem List   Diagnosis     Calculus of right kidney     Xanthogranulomatous pyelonephritis     Anemia     Benign essential hypertension     Non morbid obesity due to excess calories     Cervical high risk HPV (human papillomavirus) test positive     ASCUS with positive high risk HPV cervical       Wt Readings from Last 2 Encounters:   05/09/22 105.2 kg (232 lb)   11/09/17 108.4 kg (239 lb)     BP Readings from Last 3 Encounters:   05/09/22 (!) 142/89   11/09/17 (!) 171/93   09/20/17 (!) 148/93       No Known Allergies    Current Outpatient Medications   Medication     ciprofloxacin (CIPRO) 500 MG tablet     LORazepam (ATIVAN) 0.5 MG tablet     losartan (COZAAR) 50 MG tablet     metoprolol tartrate (LOPRESSOR) 25 MG tablet     CINNAMON PO     No current facility-administered medications for this visit.       Social History     Tobacco Use     Smoking status: Never Smoker     Smokeless tobacco: Never Used   Substance Use Topics     Alcohol use: Yes     Alcohol/week: 0.0 standard drinks     Drug use: No       Honoring Choices - Health Care Directive Guide offered to patient at time of visit.    Health Maintenance Due   Topic Date Due     ADVANCE CARE PLANNING  Never done     HIV SCREENING  Never done     HEPATITIS C SCREENING  Never done     DTAP/TDAP/TD IMMUNIZATION (1 - Tdap) 07/31/2014     PREVENTIVE CARE VISIT  09/20/2018     BMP  09/20/2018     HPV TEST  09/27/2020     PAP  09/27/2020       Immunization History   Administered Date(s) Administered     Influenza Vaccine IM > 6 months Valent IIV4 (Alfuria,Fluzone) " 09/20/2017     Tetanus 07/30/2014       Lab Results   Component Value Date    PAP ASC-US 09/20/2017       Recent Labs   Lab Test 09/20/17  1009 05/20/16  1246 10/13/15  1420   * 154*  --    HDL 56 55  --    TRIG 197* 181*  --    CR 0.80  --  0.70   GFRESTIMATED 80  --  >90  Non  GFR Calc     GFRESTBLACK >90  --  >90  African American GFR Calc     POTASSIUM 4.0  --  3.6   TSH  --  1.51  --        PHQ-2 ( 1999 Pfizer) 5/9/2022 4/8/2022   Q1: Little interest or pleasure in doing things 0 1   Q2: Feeling down, depressed or hopeless 0 1   PHQ-2 Score 0 2   Q1: Little interest or pleasure in doing things - Several days   Q2: Feeling down, depressed or hopeless - Several days   PHQ-2 Score - 2       No flowsheet data found.    No flowsheet data found.    No flowsheet data found.    Hakeem Rowley    May 9, 2022 1:10 PM

## 2022-05-09 NOTE — PROGRESS NOTES
Afua Galvan is a 42 year old female who presents today with multiple issues and to establish care.  Her primary concern today is to review labs, other appointments and imaging related to a longstanding need for her to have a right nephrectomy.  Her primary health care had been with [facility], she was diagnosed 7 years ago with a non-functioning right kidney, it appears due to kidney stones and nephrohydrosis, obstructive issues.  Afua has been aware of this, recently she has had right pelvic pain and is currently being treated for a presumed infection per Dr. GALILEO Orellana (4/29/22)  in our system, a urologist.  She has also seen Rochelle Younger in that urology department who confirmed the information with CT and a renogram.      Afua is interested in changing her care team to the [facility] from [facility].  She will consider doing primary care here and working out of this clinic for her various needs.  She is due for an annual health review and exam.      Afua would like to discuss some verbiage she saw when reviewing her CT results from the various sources, specifically related to an ovarian cyst and to what she thought were nodules on a lung view.      Afua's father passed away on March 5, 2022 after a long illness and 3 months in the hospital.  She has experienced anxiety since the age of 22, this has flared with what is going on in her life.  She has used lorazepam 0.5 mg up to twice daily for situational anxiety, she does not feel that she has to be on a daily medication.      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       Review Of Systems  Skin: negative  Eyes: negative  Ears/Nose/Throat: negative  Respiratory: No shortness of breath, dyspnea on exertion, cough, or hemoptysis  Cardiovascular: negative  Gastrointestinal: as above  Genitourinary: as above  Musculoskeletal: negative  Neurologic: negative  Psychiatric: as  above  Hematologic/Lymphatic/Immunologic: negative  Endocrine: negative    Past Medical History:   Diagnosis Date     Anemia      BMI 32.0-32.9,adult 10/12/2015     Calculus of right kidney 10/6/2015     Cervical high risk HPV (human papillomavirus) test positive 5/20/16 5/20/16 NIL/+ HR HPV (not 16 or 18)     Hypertension      Xanthogranulomatous pyelonephritis 10/6/2015     Past Surgical History:   Procedure Laterality Date     COMBINED CYSTOSCOPY, RETROGRADES, URETEROSCOPY, INSERT STENT Right 8/10/2015    Procedure: COMBINED CYSTOSCOPY, RETROGRADES, URETEROSCOPY, INSERT STENT;  Surgeon: Radha Garcia MD;  Location: [facility] OR     LASER HOLMIUM LITHOTRIPSY URETER(S), INSERT STENT, COMBINED Right 10/19/2015    Procedure: COMBINED CYSTOSCOPY, URETEROSCOPY, LASER HOLMIUM LITHOTRIPSY URETER(S), INSERT STENT;  Surgeon: Radha Garcia MD;  Location: [facility] OR     Social History     Socioeconomic History     Marital status: Single     Spouse name: Not on file     Number of children: Not on file     Years of education: Not on file     Highest education level: Not on file   Occupational History     Not on file   Tobacco Use     Smoking status: Never Smoker     Smokeless tobacco: Never Used   Substance and Sexual Activity     Alcohol use: Yes     Comment: 1-2 maybe     Drug use: No     Sexual activity: Not Currently     Partners: Male   Other Topics Concern     Parent/sibling w/ CABG, MI or angioplasty before 65F 55M? Not Asked   Social History Narrative     Not on file     Social Determinants of Health     Financial Resource Strain: Not on file   Food Insecurity: Not on file   Transportation Needs: Not on file   Physical Activity: Not on file   Stress: Not on file   Social Connections: Not on file   Intimate Partner Violence: Not on file   Housing Stability: Not on file     Family History   Problem Relation Age of Onset     Hypertension Mother      Lung Cancer Father      Cancer Father      Diabetes No  "family hx of      Coronary Artery Disease No family hx of      Hyperlipidemia No family hx of      Cerebrovascular Disease No family hx of      Breast Cancer No family hx of      Colon Cancer No family hx of      Prostate Cancer No family hx of      Other Cancer No family hx of      Depression No family hx of      Anxiety Disorder No family hx of      Mental Illness No family hx of      Substance Abuse No family hx of      Anesthesia Reaction No family hx of      Asthma No family hx of      Osteoporosis No family hx of      Genetic Disorder No family hx of      Thyroid Disease No family hx of      Obesity No family hx of        BP (!) 142/89   Pulse 76   Temp 98.4  F (36.9  C) (Oral)   Ht 1.702 m (5' 7\")   Wt 105.2 kg (232 lb)   LMP  (LMP Unknown)   SpO2 98%   BMI 36.34 kg/m      Exam:  Constitutional: healthy, alert and no distress  Psychiatric: mentation appears normal and affect normal/bright    Assessment/Plan:    (Z00.00) Healthcare maintenance  (primary encounter diagnosis)    Plan: Screening Mammogram Digital Bilateral      (N20.0) Kidney stones  Comment: due to her non functioning right kidney and because of the recommendation of her specialists, I encouraged her to consider the surgery and to move in that direction.  She is in the middle of the processes around her father's death, I told Afua that I would contact her urology providers as to timing parameters    (E66.01) Morbid obesity (H)    (Z76.89) Encounter to establish care    I spent 45 minutes with Afua related to her care and reviewed records during the course of her appointment.    Options for treatment and follow-up care were reviewed with the patient. Patient engaged in the decision making process and verbalized understanding of the options discussed and agreed with the final plan.    "

## 2022-05-10 ENCOUNTER — TELEPHONE (OUTPATIENT)
Dept: UROLOGY | Facility: CLINIC | Age: 42
End: 2022-05-10
Payer: COMMERCIAL

## 2022-05-10 NOTE — TELEPHONE ENCOUNTER
Called patient to schedule procedure with Dr. Orellana, there was no answer.      Left message for patient on 5/10/22 to schedule.     Олег Reynoso  Nathalie-Op Coordinator for Urology Surgery    [phone]

## 2022-05-16 ENCOUNTER — PREP FOR PROCEDURE (OUTPATIENT)
Dept: UROLOGY | Facility: CLINIC | Age: 42
End: 2022-05-16
Payer: COMMERCIAL

## 2022-05-17 NOTE — TELEPHONE ENCOUNTER
Called patient to schedule procedure with Dr. Orellana, there was no answer.      Left message for patient on 5/17/22 to schedule, will send patient HelpMeNowhart message.      Олег Reynoso  Nathalie-Op Coordinator for Urology Surgery    [phone]

## 2022-05-20 ENCOUNTER — ANCILLARY PROCEDURE (OUTPATIENT)
Dept: MAMMOGRAPHY | Facility: CLINIC | Age: 42
End: 2022-05-20
Attending: NURSE PRACTITIONER
Payer: COMMERCIAL

## 2022-05-20 DIAGNOSIS — Z00.00 HEALTHCARE MAINTENANCE: ICD-10-CM

## 2022-05-20 PROCEDURE — 77067 SCR MAMMO BI INCL CAD: CPT | Mod: GC | Performed by: STUDENT IN AN ORGANIZED HEALTH CARE EDUCATION/TRAINING PROGRAM

## 2022-05-31 ENCOUNTER — TELEPHONE (OUTPATIENT)
Dept: UROLOGY | Facility: CLINIC | Age: 42
End: 2022-05-31
Payer: COMMERCIAL

## 2022-05-31 NOTE — TELEPHONE ENCOUNTER
Patient is scheduled for procedure with Dr. Orellana     Spoke with: Patient via AOMit     Date of Surgery: Monday August 08, 2022     Location: [place] OR      Informed patient they will need an adult : Yes     Pre-op: Yes      H&P: Patient to schedule with PCP      Pre-procedure COVID-19 Test: Thursday August 04, 2022 at [facility]     Post-op: Friday August 19, 2022    Additional imaging/appointments:     Additional comments:      Surgery packet: Sent via mail 5/31/22    Patient is aware that surgery time is tentative to change and to expect a call 3-1 business days from Pre Admission Nursing for instructions and arrival time

## 2022-06-10 ENCOUNTER — MYC MEDICAL ADVICE (OUTPATIENT)
Dept: UROLOGY | Facility: CLINIC | Age: 42
End: 2022-06-10

## 2022-07-18 ENCOUNTER — MYC MEDICAL ADVICE (OUTPATIENT)
Dept: UROLOGY | Facility: CLINIC | Age: 42
End: 2022-07-18

## 2022-07-19 NOTE — TELEPHONE ENCOUNTER
My Chart.  Kaleigh Cazares LPN  Urology Clinic Service   [facility]

## 2022-07-22 ENCOUNTER — MYC MEDICAL ADVICE (OUTPATIENT)
Dept: FAMILY MEDICINE | Facility: CLINIC | Age: 42
End: 2022-07-22

## 2022-07-22 ENCOUNTER — TELEPHONE (OUTPATIENT)
Dept: UROLOGY | Facility: CLINIC | Age: 42
End: 2022-07-22

## 2022-07-22 ENCOUNTER — TELEPHONE (OUTPATIENT)
Dept: FAMILY MEDICINE | Facility: CLINIC | Age: 42
End: 2022-07-22

## 2022-07-22 DIAGNOSIS — F41.9 ANXIETY: ICD-10-CM

## 2022-07-22 DIAGNOSIS — I10 BENIGN ESSENTIAL HYPERTENSION: Primary | ICD-10-CM

## 2022-07-22 RX ORDER — LORAZEPAM 0.5 MG/1
0.25 TABLET ORAL EVERY 6 HOURS PRN
Qty: 15 TABLET | Refills: 0 | Status: SHIPPED | OUTPATIENT
Start: 2022-07-22 | End: 2024-01-03

## 2022-07-22 NOTE — TELEPHONE ENCOUNTER
Hello, they are a patient of Trista and requesting refills on their medications, is there anyone who can refill it on Trista's behalf?

## 2022-07-22 NOTE — TELEPHONE ENCOUNTER
Franny Belcher, RN  You 3 minutes ago (2:39 PM)     JT    Please let her know that 8/4 would be the day she would have to get her COVID testing done. Thank you      Writer called and spoke to pt. Writer stated Franny's message above and provided fax number to clinic.

## 2022-07-22 NOTE — TELEPHONE ENCOUNTER
[facility] phone call message - patient requesting a refill:    Full Medication Name: Lorazepam 0.5mg, metoprolol tartrate (LOPRESSOR) 100mg,  losartan (COZAAR) 50 MG tablet      Dose: 0.5 mg, 100 mg, 50mg     Pharmacy confirmed as   [id number] - [place], [place] - [address] AT [facility] [address]  [address]  [place] [address]  Phone: [phone] Fax: [phone]  : Yes    Medication tab checked to see if medication has been sent  Yes    Is patient out of medication: No.  1 week left  of the Lopressor and Cozaar and one day left of the Lorazepam days left    Did patient contact the pharmacy? Yes    Additional Comments:      Primary language: English      needed? No    Call taken on July 22, 2022 at 3:06 PM by Hakeem Rowley    Route to P [facility] MED REFILLS TEAM     ++If medication is a controlled substance, please route directly to the provider++

## 2022-07-22 NOTE — TELEPHONE ENCOUNTER
[facility] Call Center    Phone Message    May a detailed message be left on voicemail: yes     Reason for Call: Other: Afua is calling wondering if she went through her PCP to get the UA, preop and covid if she should do 8/2 or 8/4 for her 8/8 surgery with Dr. Orellana, just so she doesn't need to make multiple trips.Please call her back to discuss, thanks!     Action Taken: Message routed to:  [facility] ([facility] uro    Travel Screening: Not Applicable

## 2022-07-23 RX ORDER — METOPROLOL TARTRATE 50 MG
100 TABLET ORAL 2 TIMES DAILY
Qty: 360 TABLET | Refills: 0 | Status: SHIPPED | OUTPATIENT
Start: 2022-07-23 | End: 2022-07-29

## 2022-07-23 RX ORDER — LOSARTAN POTASSIUM 50 MG/1
50 TABLET ORAL DAILY
Qty: 90 TABLET | Refills: 0 | Status: SHIPPED | OUTPATIENT
Start: 2022-07-23 | End: 2022-10-25

## 2022-07-23 NOTE — TELEPHONE ENCOUNTER
I sent in the lorazepam. I sent a message to the patient clarifying her metoprolol and losartan doses.

## 2022-07-26 ENCOUNTER — MYC MEDICAL ADVICE (OUTPATIENT)
Dept: UROLOGY | Facility: CLINIC | Age: 42
End: 2022-07-26

## 2022-07-26 DIAGNOSIS — N20.0 KIDNEY STONES: Primary | ICD-10-CM

## 2022-07-26 DIAGNOSIS — N20.0 NEPHROLITHIASIS: ICD-10-CM

## 2022-07-26 NOTE — TELEPHONE ENCOUNTER
Pt called requesting to speak with Kaleigh stating she does have questions. She is open to speak through mychart or phone, but her mychart would not allow her to reply stating it was an old msg to reply to. Please reach out to pt to discuss. Thanks

## 2022-07-28 ENCOUNTER — NURSE TRIAGE (OUTPATIENT)
Dept: UROLOGY | Facility: CLINIC | Age: 42
End: 2022-07-28

## 2022-07-28 ENCOUNTER — LAB (OUTPATIENT)
Dept: LAB | Facility: CLINIC | Age: 42
End: 2022-07-28
Payer: MEDICAID

## 2022-07-28 DIAGNOSIS — N20.0 NEPHROLITHIASIS: ICD-10-CM

## 2022-07-28 DIAGNOSIS — N20.0 KIDNEY STONES: ICD-10-CM

## 2022-07-28 DIAGNOSIS — N39.0 URINARY TRACT INFECTION: Primary | ICD-10-CM

## 2022-07-28 LAB
ALBUMIN UR-MCNC: 30 MG/DL
APPEARANCE UR: CLEAR
BACTERIA #/AREA URNS HPF: ABNORMAL /HPF
BILIRUB UR QL STRIP: NEGATIVE
COLOR UR AUTO: YELLOW
GLUCOSE UR STRIP-MCNC: NEGATIVE MG/DL
HGB UR QL STRIP: ABNORMAL
KETONES UR STRIP-MCNC: NEGATIVE MG/DL
LEUKOCYTE ESTERASE UR QL STRIP: ABNORMAL
NITRATE UR QL: NEGATIVE
PH UR STRIP: 6 [PH] (ref 5–7)
RBC #/AREA URNS AUTO: ABNORMAL /HPF
SP GR UR STRIP: <=1.005 (ref 1–1.03)
SQUAMOUS #/AREA URNS AUTO: ABNORMAL /LPF
UROBILINOGEN UR STRIP-ACNC: 0.2 E.U./DL
WBC #/AREA URNS AUTO: ABNORMAL /HPF

## 2022-07-28 PROCEDURE — 87086 URINE CULTURE/COLONY COUNT: CPT

## 2022-07-28 PROCEDURE — 81001 URINALYSIS AUTO W/SCOPE: CPT

## 2022-07-28 RX ORDER — SULFAMETHOXAZOLE/TRIMETHOPRIM 800-160 MG
1 TABLET ORAL 2 TIMES DAILY
Qty: 20 TABLET | Refills: 0 | Status: SHIPPED | OUTPATIENT
Start: 2022-07-28 | End: 2024-01-03

## 2022-07-28 NOTE — TELEPHONE ENCOUNTER
Nurse Triage SBAR    Is this a 2nd Level Triage? YES, LICENSED PRACTITIONER REVIEW IS REQUIRED    Situation: Spoke to pt. Pt reports having right flank pain.     Background: Pt is scheduled for right nephrectomy on 8/8/22.   Noted kidney stones present.     Assessment: Pt reports pain started weeks ago and has progressively gotten worse. Pt is currently taking tylenol and ibuprofen. She reports that pain is tolerable enough throughout the day and is able to complete her daily tasks and eat and drink, but at night sweats due to discomfort. Pt has to position self comfortably when pressure is placed to that side. Almost feels like it is bruised when touched. Drinking at least 8 glasses of water a day. No fever. No difficulty with urination. No hematuria. No dysuria. Chart and problems list reviewed.      Protocol Recommended Disposition:   See Within 3 Days In Office     Recommendation: Wait for culture results.   Bactrim DS ordered until surgery date.      Discussed with Dr. Nieto via phone call.     Does the patient meet one of the following criteria for ADS visit consideration? No    Anali Doe RN MSN    Signed Prescriptions:                        Disp   Refills    sulfamethoxazole-trimethoprim (BACTRIM DS)*20 tab*0        Sig: Take 1 tablet by mouth 2 times daily  Authorizing Provider: ARIEL NIETO  Ordering User: ANALI DOE      Reason for Disposition    History of kidney stones    Protocols used: FLANK PAIN-A-OH

## 2022-07-28 NOTE — TELEPHONE ENCOUNTER
Call center called with pt on the line asking about what to do while we wait for culture. Writer transferred call to Anali.

## 2022-07-29 ENCOUNTER — VIRTUAL VISIT (OUTPATIENT)
Dept: FAMILY MEDICINE | Facility: CLINIC | Age: 42
End: 2022-07-29

## 2022-07-29 DIAGNOSIS — R10.11 ABDOMINAL PAIN, RIGHT UPPER QUADRANT: Primary | ICD-10-CM

## 2022-07-29 RX ORDER — METOPROLOL TARTRATE 100 MG
100 TABLET ORAL 2 TIMES DAILY
COMMUNITY
End: 2022-10-25

## 2022-07-29 NOTE — PROGRESS NOTES
Afua is a 42 year old who is being evaluated via a billable video visit.      How would you like to obtain your AVS? MyChart  If the video visit is dropped, the invitation should be resent by: Text to cell phone: [phone]  Will anyone else be joining your video visit? No    Assessment & Plan     Abdominal pain, right upper quadrant  -Discussed the differential for abdominal pain is very broad and is not appropriate for a virtual visit. Pt appears nontoxic and is sitting/talking comfortably. Exam is limited by virtual visit. DDX includes pregnancy, cholelithiasis, cholecystitis, UTI, cholelithiasis among other possibilities. Reassuring pt is NOT having the pain right now, no nausea, vomiting, fever or chills. Discussed red flag symptoms for going to the ED, when to consider seeking urgent care over the weekend. Otherwise if the pain remains intermittently mild to moderate over the weekend, reasonable for pt to wait until in-person on visit on Monday with Francis Bone NP.    Appt scheduled for Monday, Aug 1 in person with Francis Bone NP.    Urvashi Eden, NP  [facility]    Subjective   Afua is a 42 year old presenting for the following health issues:  Cholelithiasis and Abdominal Pain    HPI     RUQ abd pain    Patient presents for a virtual visit for intermittent RUQ and generalized abdominal pain present for the last 2-3 weeks. Pain is 7/10, occurs intermittently, lasts a few minutes.  She is currently being treated for a UTI with bactrim. Started antibiotics yesterday. Not sure if this pain is related to her UTI, gallbladder stones, or kidney stones. Had Cholelithiasis in 2015. In 2016, had some abd pain while in [place]. ED scanned her and found gallstones- provided pain meds but never removed the stones. In March 2022, she developed severe pain again, followed-up with nephrology who found a large stone in UPJ and lower pole of right kidney. She has minimal right kidney function, so nephrology  has scheduled a nephrectomy on 8/8/2022. No increase in pain while eating fatty foods.     She is currently taking ibuprofen and tylenol around the clock for abdominal pain. No fever or chills. Felt flushed a few days ago, but better since starting UTI antibiotic. Mild nausea. No vomiting. Able to tolerate food and fluids. No blood in her stool. No diarrhea. No vaginal bleeding. She is NOT having the pain right now.    Review of Systems   See HPI      Objective       Vitals:  No vitals were obtained today due to virtual visit.    Physical Exam   GENERAL: Healthy, alert and no distress  EYES: Eyes grossly normal to inspection.  No discharge or erythema, or obvious scleral/conjunctival abnormalities.  RESP: No audible wheeze, cough, or visible cyanosis.  No visible retractions or increased work of breathing.    SKIN: Visible skin clear. No significant rash, abnormal pigmentation or lesions.  NEURO: Cranial nerves grossly intact.  Mentation and speech appropriate for age.  PSYCH: Mentation appears normal, affect normal/bright, judgement and insight intact, normal speech and appearance well-groomed.          Video-Visit Details    Video Start Time: 1:52 PM    Type of service:  Video Visit    Video End Time:2:12 PM    Originating Location (pt. Location): Home    Distant Location (provider location):  [facility]     Platform used for Video Visit: Chacha Nolasco

## 2022-07-30 LAB — BACTERIA UR CULT: NORMAL

## 2022-08-01 ENCOUNTER — TELEPHONE (OUTPATIENT)
Dept: UROLOGY | Facility: CLINIC | Age: 42
End: 2022-08-01

## 2022-08-01 DIAGNOSIS — R10.9 FLANK PAIN: Primary | ICD-10-CM

## 2022-08-01 DIAGNOSIS — N20.0 KIDNEY STONE: ICD-10-CM

## 2022-08-01 RX ORDER — OXYCODONE HYDROCHLORIDE 5 MG/1
5 TABLET ORAL EVERY 6 HOURS PRN
Qty: 12 TABLET | Refills: 0 | Status: ON HOLD | OUTPATIENT
Start: 2022-08-01 | End: 2022-08-05

## 2022-08-01 NOTE — TELEPHONE ENCOUNTER
Spoke to pt. Pt reports that her symptoms have improved since starting the antibiotics. She reports that flank pain persists that can be rated up to 8-9/10. She is having difficulty sleeping. Pt is alternating with tylenol and ibuprofen, head and ice. These interventions are helping to manage her pain. Pt confirmed she is able to eat and drink and participate in the daily activities that she needs to do. She is mostly concerned about not being able to take ibuprofen this week in preparation for 8/8/22 surgery.     Spoke to Rochelle Younger NP. Reviewed pt's situation with provider. She will place order for oxycodone for pt to use PRN for breakthrough pain.     Spoke to pt and informed her of order. Also advised pt not to take medication with ativan. Pt states that she's been taking ativan to help take the edge off. Pt understands not to take the medications together due to risk of respiratory concerns. Will call clinic back as needed.     Anali Lisa RN MSN

## 2022-08-01 NOTE — TELEPHONE ENCOUNTER
[facility]    Phone Message    May a detailed message be left on voicemail: yes     Reason for Call: Requesting Results   Name/type of test: UA/UC labs  Date of test: 7/28/22  Was test done at a location other than [facility]?: No    Wanting to know what kind of bacteria it is, so she can be put on the correct antibiotic before her surgery 8/8/22.      Action Taken: Message routed to:  [facility] uro    Travel Screening: Not Applicable

## 2022-08-01 NOTE — TELEPHONE ENCOUNTER
[facility]    Phone Message    May a detailed message be left on voicemail: yes     Reason for Call: Symptoms or Concerns     If patient has red-flag symptoms, warm transfer to triage line    Current symptom or concern: Flank pain. Appt with GP in 45 min.  Per patient, her flank pain has increased. Her UTI symptoms have gone away.  She is not supposed to take advil for one week before surgery.  Surgery scheduled for 8/8/22. Needs directions as she is in a lot of pain.    Symptoms have been present for:  2 week(s)    Has patient previously been seen for this? No      Are there any new or worsening symptoms? Yes: Flank pain      Action Taken: Message routed to:  [facility] Urology    Travel Screening: Not Applicable

## 2022-08-03 NOTE — PROGRESS NOTES
Spoke to pt. Advised pt to go to ER to be further evaluated due to persisting right flank pain. Pt agrees to go.     Anali Lisa RN MSN

## 2022-08-04 ENCOUNTER — HOSPITAL ENCOUNTER (INPATIENT)
Facility: CLINIC | Age: 42
LOS: 1 days | Discharge: HOME | End: 2022-08-05
Attending: UROLOGY | Admitting: UROLOGY
Payer: COMMERCIAL

## 2022-08-04 ENCOUNTER — TELEPHONE (OUTPATIENT)
Dept: UROLOGY | Facility: CLINIC | Age: 42
End: 2022-08-04

## 2022-08-04 DIAGNOSIS — N11.8 XANTHOGRANULOMATOUS PYELONEPHRITIS: Primary | ICD-10-CM

## 2022-08-04 LAB
ANION GAP SERPL CALCULATED.3IONS-SCNC: 10 MMOL/L (ref 7–15)
BUN SERPL-MCNC: 14.2 MG/DL (ref 6–20)
CALCIUM SERPL-MCNC: 9.4 MG/DL (ref 8.6–10)
CHLORIDE SERPL-SCNC: 102 MMOL/L (ref 98–107)
CREAT SERPL-MCNC: 0.76 MG/DL (ref 0.51–0.95)
DEPRECATED HCO3 PLAS-SCNC: 22 MMOL/L (ref 22–29)
ERYTHROCYTE [DISTWIDTH] IN BLOOD BY AUTOMATED COUNT: 15.4 % (ref 10–15)
GFR SERPL CREATININE-BSD FRML MDRD: >90 ML/MIN/1.73M2
GLUCOSE SERPL-MCNC: 122 MG/DL (ref 70–99)
HCT VFR BLD AUTO: 31.9 % (ref 35–47)
HGB BLD-MCNC: 9.7 G/DL (ref 11.7–15.7)
MCH RBC QN AUTO: 23.5 PG (ref 26.5–33)
MCHC RBC AUTO-ENTMCNC: 30.4 G/DL (ref 31.5–36.5)
MCV RBC AUTO: 77 FL (ref 78–100)
PLATELET # BLD AUTO: 406 10E3/UL (ref 150–450)
POTASSIUM SERPL-SCNC: 4.1 MMOL/L (ref 3.4–5.3)
RBC # BLD AUTO: 4.12 10E6/UL (ref 3.8–5.2)
SODIUM SERPL-SCNC: 134 MMOL/L (ref 136–145)
WBC # BLD AUTO: 9.6 10E3/UL (ref 4–11)

## 2022-08-04 PROCEDURE — 250N000013 HC RX MED GY IP 250 OP 250 PS 637: Performed by: STUDENT IN AN ORGANIZED HEALTH CARE EDUCATION/TRAINING PROGRAM

## 2022-08-04 PROCEDURE — 250N000011 HC RX IP 250 OP 636: Performed by: STUDENT IN AN ORGANIZED HEALTH CARE EDUCATION/TRAINING PROGRAM

## 2022-08-04 PROCEDURE — 36415 COLL VENOUS BLD VENIPUNCTURE: CPT | Performed by: STUDENT IN AN ORGANIZED HEALTH CARE EDUCATION/TRAINING PROGRAM

## 2022-08-04 PROCEDURE — 85027 COMPLETE CBC AUTOMATED: CPT | Performed by: STUDENT IN AN ORGANIZED HEALTH CARE EDUCATION/TRAINING PROGRAM

## 2022-08-04 PROCEDURE — 82310 ASSAY OF CALCIUM: CPT | Performed by: STUDENT IN AN ORGANIZED HEALTH CARE EDUCATION/TRAINING PROGRAM

## 2022-08-04 PROCEDURE — 82374 ASSAY BLOOD CARBON DIOXIDE: CPT | Performed by: STUDENT IN AN ORGANIZED HEALTH CARE EDUCATION/TRAINING PROGRAM

## 2022-08-04 PROCEDURE — 120N000002 HC R&B MED SURG/OB UMMC

## 2022-08-04 RX ORDER — LANOLIN ALCOHOL/MO/W.PET/CERES
3 CREAM (GRAM) TOPICAL
Status: DISCONTINUED | OUTPATIENT
Start: 2022-08-04 | End: 2022-08-05 | Stop reason: HOSPADM

## 2022-08-04 RX ORDER — BISACODYL 10 MG
10 SUPPOSITORY, RECTAL RECTAL DAILY PRN
Status: DISCONTINUED | OUTPATIENT
Start: 2022-08-04 | End: 2022-08-05 | Stop reason: HOSPADM

## 2022-08-04 RX ORDER — OXYCODONE HYDROCHLORIDE 5 MG/1
5 TABLET ORAL EVERY 4 HOURS PRN
Status: DISCONTINUED | OUTPATIENT
Start: 2022-08-04 | End: 2022-08-05 | Stop reason: HOSPADM

## 2022-08-04 RX ORDER — NALOXONE HYDROCHLORIDE 0.4 MG/ML
0.4 INJECTION, SOLUTION INTRAMUSCULAR; INTRAVENOUS; SUBCUTANEOUS
Status: DISCONTINUED | OUTPATIENT
Start: 2022-08-04 | End: 2022-08-05 | Stop reason: HOSPADM

## 2022-08-04 RX ORDER — PROCHLORPERAZINE MALEATE 5 MG
10 TABLET ORAL EVERY 6 HOURS PRN
Status: DISCONTINUED | OUTPATIENT
Start: 2022-08-04 | End: 2022-08-05 | Stop reason: HOSPADM

## 2022-08-04 RX ORDER — OXYCODONE HYDROCHLORIDE 10 MG/1
10 TABLET ORAL EVERY 4 HOURS PRN
Status: DISCONTINUED | OUTPATIENT
Start: 2022-08-04 | End: 2022-08-05 | Stop reason: HOSPADM

## 2022-08-04 RX ORDER — ONDANSETRON 2 MG/ML
4 INJECTION INTRAMUSCULAR; INTRAVENOUS EVERY 6 HOURS PRN
Status: DISCONTINUED | OUTPATIENT
Start: 2022-08-04 | End: 2022-08-05 | Stop reason: HOSPADM

## 2022-08-04 RX ORDER — PIPERACILLIN SODIUM, TAZOBACTAM SODIUM 3; .375 G/15ML; G/15ML
3.38 INJECTION, POWDER, LYOPHILIZED, FOR SOLUTION INTRAVENOUS EVERY 6 HOURS
Status: DISCONTINUED | OUTPATIENT
Start: 2022-08-04 | End: 2022-08-05 | Stop reason: HOSPADM

## 2022-08-04 RX ORDER — HYDROXYZINE HYDROCHLORIDE 25 MG/1
25 TABLET, FILM COATED ORAL EVERY 6 HOURS PRN
Status: DISCONTINUED | OUTPATIENT
Start: 2022-08-04 | End: 2022-08-05 | Stop reason: HOSPADM

## 2022-08-04 RX ORDER — METOPROLOL TARTRATE 50 MG
100 TABLET ORAL 2 TIMES DAILY
Status: DISCONTINUED | OUTPATIENT
Start: 2022-08-04 | End: 2022-08-05 | Stop reason: HOSPADM

## 2022-08-04 RX ORDER — CALCIUM CARBONATE 500 MG/1
500 TABLET, CHEWABLE ORAL 4 TIMES DAILY PRN
Status: DISCONTINUED | OUTPATIENT
Start: 2022-08-04 | End: 2022-08-05 | Stop reason: HOSPADM

## 2022-08-04 RX ORDER — NALOXONE HYDROCHLORIDE 0.4 MG/ML
0.2 INJECTION, SOLUTION INTRAMUSCULAR; INTRAVENOUS; SUBCUTANEOUS
Status: DISCONTINUED | OUTPATIENT
Start: 2022-08-04 | End: 2022-08-05 | Stop reason: HOSPADM

## 2022-08-04 RX ORDER — ACETAMINOPHEN 325 MG/1
650 TABLET ORAL EVERY 4 HOURS PRN
Status: DISCONTINUED | OUTPATIENT
Start: 2022-08-07 | End: 2022-08-05

## 2022-08-04 RX ORDER — LOSARTAN POTASSIUM 50 MG/1
50 TABLET ORAL DAILY
Status: DISCONTINUED | OUTPATIENT
Start: 2022-08-05 | End: 2022-08-05 | Stop reason: HOSPADM

## 2022-08-04 RX ORDER — ONDANSETRON 4 MG/1
4 TABLET, ORALLY DISINTEGRATING ORAL EVERY 6 HOURS PRN
Status: DISCONTINUED | OUTPATIENT
Start: 2022-08-04 | End: 2022-08-05 | Stop reason: HOSPADM

## 2022-08-04 RX ORDER — AMOXICILLIN 250 MG
1 CAPSULE ORAL 2 TIMES DAILY
Status: DISCONTINUED | OUTPATIENT
Start: 2022-08-04 | End: 2022-08-04

## 2022-08-04 RX ORDER — POLYETHYLENE GLYCOL 3350 17 G/17G
17 POWDER, FOR SOLUTION ORAL DAILY
Status: DISCONTINUED | OUTPATIENT
Start: 2022-08-05 | End: 2022-08-04

## 2022-08-04 RX ORDER — LIDOCAINE 40 MG/G
CREAM TOPICAL
Status: DISCONTINUED | OUTPATIENT
Start: 2022-08-04 | End: 2022-08-05 | Stop reason: HOSPADM

## 2022-08-04 RX ADMIN — PIPERACILLIN AND TAZOBACTAM 3.38 G: 3; .375 INJECTION, POWDER, LYOPHILIZED, FOR SOLUTION INTRAVENOUS at 23:42

## 2022-08-04 RX ADMIN — OXYCODONE HYDROCHLORIDE 5 MG: 5 TABLET ORAL at 22:39

## 2022-08-04 RX ADMIN — METOPROLOL TARTRATE 100 MG: 50 TABLET, FILM COATED ORAL at 22:39

## 2022-08-04 RX ADMIN — HYDROXYZINE HYDROCHLORIDE 25 MG: 25 TABLET ORAL at 22:18

## 2022-08-04 ASSESSMENT — ACTIVITIES OF DAILY LIVING (ADL)
CONCENTRATING,_REMEMBERING_OR_MAKING_DECISIONS_DIFFICULTY: NO
FALL_HISTORY_WITHIN_LAST_SIX_MONTHS: NO
DOING_ERRANDS_INDEPENDENTLY_DIFFICULTY: NO
DRESSING/BATHING_DIFFICULTY: NO
DIFFICULTY_EATING/SWALLOWING: NO
ADLS_ACUITY_SCORE: 18
WEAR_GLASSES_OR_BLIND: NO
CHANGE_IN_FUNCTIONAL_STATUS_SINCE_ONSET_OF_CURRENT_ILLNESS/INJURY: NO
TOILETING_ISSUES: NO
WALKING_OR_CLIMBING_STAIRS_DIFFICULTY: NO

## 2022-08-05 VITALS
OXYGEN SATURATION: 100 % | SYSTOLIC BLOOD PRESSURE: 129 MMHG | HEART RATE: 74 BPM | RESPIRATION RATE: 16 BRPM | HEIGHT: 67 IN | DIASTOLIC BLOOD PRESSURE: 79 MMHG | WEIGHT: 219 LBS | BODY MASS INDEX: 34.37 KG/M2 | TEMPERATURE: 96.7 F

## 2022-08-05 LAB
ANION GAP SERPL CALCULATED.3IONS-SCNC: 9 MMOL/L (ref 7–15)
BUN SERPL-MCNC: 10.5 MG/DL (ref 6–20)
CALCIUM SERPL-MCNC: 9.1 MG/DL (ref 8.6–10)
CHLORIDE SERPL-SCNC: 104 MMOL/L (ref 98–107)
CREAT SERPL-MCNC: 0.81 MG/DL (ref 0.51–0.95)
DEPRECATED HCO3 PLAS-SCNC: 24 MMOL/L (ref 22–29)
ERYTHROCYTE [DISTWIDTH] IN BLOOD BY AUTOMATED COUNT: 15.8 % (ref 10–15)
GFR SERPL CREATININE-BSD FRML MDRD: >90 ML/MIN/1.73M2
GLUCOSE SERPL-MCNC: 108 MG/DL (ref 70–99)
HCT VFR BLD AUTO: 30.6 % (ref 35–47)
HGB BLD-MCNC: 9 G/DL (ref 11.7–15.7)
MCH RBC QN AUTO: 23.1 PG (ref 26.5–33)
MCHC RBC AUTO-ENTMCNC: 29.4 G/DL (ref 31.5–36.5)
MCV RBC AUTO: 79 FL (ref 78–100)
PLATELET # BLD AUTO: 342 10E3/UL (ref 150–450)
POTASSIUM SERPL-SCNC: 4.1 MMOL/L (ref 3.4–5.3)
RBC # BLD AUTO: 3.9 10E6/UL (ref 3.8–5.2)
SARS-COV-2 RNA RESP QL NAA+PROBE: NEGATIVE
SODIUM SERPL-SCNC: 137 MMOL/L (ref 136–145)
WBC # BLD AUTO: 9.5 10E3/UL (ref 4–11)

## 2022-08-05 PROCEDURE — 250N000011 HC RX IP 250 OP 636: Performed by: STUDENT IN AN ORGANIZED HEALTH CARE EDUCATION/TRAINING PROGRAM

## 2022-08-05 PROCEDURE — 85027 COMPLETE CBC AUTOMATED: CPT | Performed by: STUDENT IN AN ORGANIZED HEALTH CARE EDUCATION/TRAINING PROGRAM

## 2022-08-05 PROCEDURE — 99232 SBSQ HOSP IP/OBS MODERATE 35: CPT | Performed by: NURSE PRACTITIONER

## 2022-08-05 PROCEDURE — 258N000003 HC RX IP 258 OP 636: Performed by: UROLOGY

## 2022-08-05 PROCEDURE — 250N000013 HC RX MED GY IP 250 OP 250 PS 637: Performed by: STUDENT IN AN ORGANIZED HEALTH CARE EDUCATION/TRAINING PROGRAM

## 2022-08-05 PROCEDURE — 93010 ELECTROCARDIOGRAM REPORT: CPT | Performed by: INTERNAL MEDICINE

## 2022-08-05 PROCEDURE — U0005 INFEC AGEN DETEC AMPLI PROBE: HCPCS | Performed by: STUDENT IN AN ORGANIZED HEALTH CARE EDUCATION/TRAINING PROGRAM

## 2022-08-05 PROCEDURE — 250N000011 HC RX IP 250 OP 636: Performed by: UROLOGY

## 2022-08-05 PROCEDURE — 36415 COLL VENOUS BLD VENIPUNCTURE: CPT | Performed by: STUDENT IN AN ORGANIZED HEALTH CARE EDUCATION/TRAINING PROGRAM

## 2022-08-05 PROCEDURE — 93005 ELECTROCARDIOGRAM TRACING: CPT

## 2022-08-05 PROCEDURE — 82310 ASSAY OF CALCIUM: CPT | Performed by: STUDENT IN AN ORGANIZED HEALTH CARE EDUCATION/TRAINING PROGRAM

## 2022-08-05 RX ORDER — ACETAMINOPHEN 325 MG/1
325 TABLET ORAL EVERY 4 HOURS PRN
Status: DISCONTINUED | OUTPATIENT
Start: 2022-08-05 | End: 2022-08-05 | Stop reason: HOSPADM

## 2022-08-05 RX ORDER — CIPROFLOXACIN 500 MG/1
500 TABLET, FILM COATED ORAL 2 TIMES DAILY
Qty: 6 TABLET | Refills: 0 | Status: ON HOLD | OUTPATIENT
Start: 2022-08-05 | End: 2022-08-09

## 2022-08-05 RX ADMIN — METOPROLOL TARTRATE 100 MG: 50 TABLET, FILM COATED ORAL at 08:34

## 2022-08-05 RX ADMIN — ACETAMINOPHEN 325 MG: 325 TABLET, FILM COATED ORAL at 08:33

## 2022-08-05 RX ADMIN — PIPERACILLIN AND TAZOBACTAM 3.38 G: 3; .375 INJECTION, POWDER, LYOPHILIZED, FOR SOLUTION INTRAVENOUS at 05:19

## 2022-08-05 RX ADMIN — VANCOMYCIN HYDROCHLORIDE 1250 MG: 10 INJECTION, POWDER, LYOPHILIZED, FOR SOLUTION INTRAVENOUS at 12:36

## 2022-08-05 RX ADMIN — LOSARTAN POTASSIUM 50 MG: 50 TABLET, FILM COATED ORAL at 08:34

## 2022-08-05 RX ADMIN — VANCOMYCIN HYDROCHLORIDE 2000 MG: 1 INJECTION, POWDER, LYOPHILIZED, FOR SOLUTION INTRAVENOUS at 00:36

## 2022-08-05 RX ADMIN — ACETAMINOPHEN 325 MG: 325 TABLET, FILM COATED ORAL at 02:56

## 2022-08-05 RX ADMIN — OXYCODONE HYDROCHLORIDE 10 MG: 10 TABLET ORAL at 02:56

## 2022-08-05 RX ADMIN — OXYCODONE HYDROCHLORIDE 5 MG: 5 TABLET ORAL at 08:32

## 2022-08-05 RX ADMIN — ACETAMINOPHEN 325 MG: 325 TABLET, FILM COATED ORAL at 14:02

## 2022-08-05 ASSESSMENT — ACTIVITIES OF DAILY LIVING (ADL)
ADLS_ACUITY_SCORE: 18

## 2022-08-05 NOTE — PROGRESS NOTES
"Urology  Progress Note    VIKYON. Transferred from OSH yesterday. Stable overnight. No pain. Sleeping comfortably.     Exam  /66 (BP Location: Right arm)   Pulse 83   Temp 97.5  F (36.4  C) (Oral)   Resp 17   Ht 1.702 m (5' 7\")   Wt 99.3 kg (219 lb)   SpO2 100%   BMI 34.30 kg/m    No acute distress  Unlabored breathing  Abdomen soft, nontender, nondistended.  No CVA tenderness bilaterally     No I/O charted     Labs  WBC 9.6  Hgb 9.7  Cr 0.76    AM labs pending    Assessment/Plan   Afua Galvan is a 42 year old female with PMH of htn, xanthogranulomatous pyelonephritis, who presents as a transfer from [facility], initially presented with significant flank pain, currently on abx for UTI, with concern of newly developed right retroperitoneal abscess. Will have images pushed over to review to determine whether patient requires a drain or other urgent intervention while in-house.      - Tylenol, PRN oxy for pain control  - continue PTA BP medications   - labs pending   - currently on Zosyn, plan to convert to PO regimen prior to discharge    Dispo: pending labs, abx plan, and staff discussion, may discharge to home for weekend prior to surgery on Monday    Seen and examined with the chief resident. Will discuss with Dr. Alonso and Esteban.     Contacting the Urology Team     Please use the following job codes to reach the Urology Team. Note that you must use an in house phone and that job codes cannot receive text pages.     On weekdays, dial [phone] (or star-star-star [phone] on the new [employer] telephones) then [phone] to reach the Adult Urology resident or PA on call    On weekdays, dial [phone] (or star-star-star [phone] on the new [employer] telephones) then [phone] to reach the Pediatric Urology resident    On weeknights and weekends, dial [phone] (or star-star-star [phone] on the new [employer] telephones) then [phone] to reach the Urology resident on call (for both Adult and Pediatrics)        "

## 2022-08-05 NOTE — DISCHARGE SUMMARY
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    Findings: The breasts have scattered areas of fibroglandular density.    There is no significant interval change.     IMPRESSION: ACR BI-RADS Category 1: Negative    RECOMMENDED FOLLOW-UP: Annual routine screening mammogram    The results and recommendations of this examination will be communicated   to the patient.    I have personally reviewed the examination and initial interpretation  and I agree with the findings.            Medications Prior to Admission:     No medications prior to admission.            Discharge Medications:     Discharge Medication List as of 8/5/2022 12:22 PM      START taking these medications    Details   ciprofloxacin (CIPRO) 500 MG tablet Take 1 tablet (500 mg) by mouth 2 times daily for 3 days, Disp-6 tablet, R-0, E-Prescribe         CONTINUE these medications which have NOT CHANGED    Details   LORazepam (ATIVAN) 0.5 MG tablet Take 0.5 tablets (0.25 mg) by mouth every 6 hours as needed for  anxiety, Disp-15 tablet, R-0, E-Prescribe      losartan (COZAAR) 50 MG tablet Take 1 tablet (50 mg) by mouth daily, Disp-90 tablet, R-0, E-Prescribe      metoprolol tartrate (LOPRESSOR) 100 MG tablet Take 100 mg by mouth 2 times daily, Historical      sulfamethoxazole-trimethoprim (BACTRIM DS) 800-160 MG tablet Take 1 tablet by mouth 2 times daily, Disp-20 tablet, R-0, E-Prescribe         STOP taking these medications       oxyCODONE (ROXICODONE) 5 MG tablet Comments:   Reason for Stopping:                      Brief History of Illness:   Afua Galvan is a 42 year old female with PMH of htn, xanthogranulomatous pyelonephritis, who presents as a transfer from [facility], initially presented with significant flank pain, currently on abx for UTI, with concern of newly developed right retroperitoneal abscess.            Hospital Course:   The patient's hospital course was unremarkable. She improved with antibiotics and after review of her images, there was no need for urgent intervention. We will continue with plan for surgery as previous scheduled next week with Dr. Orellana.    Patient was discharged home with appropriate contact information, follow-up and instructions as seen below in the discharge paperwork.         Final Pathology Result:   None         Discharge Instructions and Follow-Up:     Discharge Procedure Orders   Reason for your hospital stay   Order Comments: You were hospitalized given concerns for an infection around your kidney     Activity   Order Comments: Your activity upon discharge: activity as tolerated     Order Specific Question Answer Comments   Is discharge order? Yes      Discharge Instructions   Order Comments: Activity  - No restrictions    Diet  - Regular diet    Medications  - Ciprofloxacin is an antibiotic prescribed before your upcoming surgery. Take up until the night before surgery  - Ok to continue metoprolol through surgery  - Pleas hold losartan the day of surgery.  "Ok to take otherwise.    Follow-Up:  - Follow up on Monday as scheduled for your surgery  - Call or return sooner than your regularly scheduled visit if you develop any of the following: fever (greater than 101.5), uncontrolled pain, uncontrolled nausea or vomiting, or inability to urinate.    Phone numbers:   - Monday through Friday 8am to 4:30pm: Call [phone] with questions, requests for medication refills, or to schedule or confirm an appointment.  - Nights or weekends: call the after hours emergency pager - [phone] and tell the  \"I would like to page the Urology Resident on call.\" Please note, due to prescribing laws, resident physicians are unable to prescribe narcotics after-hours. If you feel as though you will need a refill of a narcotic pain medication, you will need to call the clinic during business hours OR seek emergency care.  - For emergencies, call [phone]     Diet   Order Comments: Follow this diet upon discharge: Orders Placed This Encounter      Regular Diet Adult    Remember to follow the instructions about food and drink consumption before your surgery on Monday 8/8/2022     Order Specific Question Answer Comments   Is discharge order? Yes             Discharge Disposition:     Discharged to Home      Condition at discharge: Good    --    Asim De Jesus MD  Urology Resident    5:11 PM, 8/5/2022    "

## 2022-08-05 NOTE — PROGRESS NOTES
"Antimicrobial Stewardship Team Note    Antimicrobial Stewardship Program - A joint venture between [facility] and  Physicians to optimize antibiotic management.  NOT a formal consult - Restricted Antimicrobial Review     Patient: Afua Galvan  MRN: [id number]  Allergies: Patient has no known allergies.    Brief Summary:   Afua Galvan is a 42 year old female with PMH of HTN, xanthogranulomatous pyelonephritis, who transferred from [facility] on 8/4/2022 for concern of newly developed right retroperitoneal abscess.    HPI:  The patient was originally seen in 2015 for renal pelvis stone and right sided xanthogranulomatous pyelonephritis. A renogram showed <5% renal function of the right kidney and she was recommended to undergo a right nephrectomy. She wanted to exhaust endoscopic care first. After endoscopic management, imaging showed minimal residual of the original stone. She has been doing well since then, until 03/2022 where she developed new onset right flank pain. 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 A repeat renogram demonstrated 3% function of the right kidney. Patient was recommended to undergo R robotic nephrectomy and it was decided to proceed, planned for 8/8.      Patient presented to the ED at [facility] on 08/03/2022 with significant right flank pain over the past 3 weeks, and needed assistance with pain control and was concerned that something else was wrong. She was also diagnosed with a UTI a week ago and received Bactrim from her PCP. In the ED, she underwent a CT abdomen/pelvis which demonstrated \"complex fluid attenuation along the lower pole of the right kidney is present extending into the R retroperitoneal space as well as into adjacent right psoas muscle. The collection " "within the R psoas muscle measured approximately 3.7cm x 3.1cm.\" The patient received 1 dose of ceftriaxone and oxycodone. She was transferred to [facility] for further management of pain and possible abscess in preparation for her nephrectomy that is scheduled for Monday 08/08/2022.    Currently, the patient is afebrile and normotensive. Creatinine and WBC are within normal limits. No imaging done here yet. No blood or urine culture. Previous urine culture on 7/28/22 showed 10k-50k mixed urogenital robert. The patient is on empiric Zosyn and vancomycin for UTI.          Active Anti-infective Medications   (From admission, onward)                 Start     Stop    08/05/22 1200  vancomycin 1250 mg  1,250 mg,   Intravenous,   EVERY 12 HOURS        Urinary Tract Infection        --    08/04/22 2300  piperacillin-tazobactam  3.375 g,   Intravenous,   EVERY 6 HOURS        Urinary Tract Infection        --                  Assessment: Complicated pyelonephritis   The patient has a history of renal pelvis stone and right sided xanthogranulomatous pyelonephritis as well as recently diagnosed UTI, started on Bactrim on 7/28/22. The patient was a community dweller prior to admission. OSH CT showed fluid in the retroperitoneal space which can be due to the history of xanthogranulomatous pyelonephritis. Urology is waiting for the CT scan from OSH to determine the course for the abscess and will request for culture results from OSH to determine the antibiotic course. Overall, patient remains clinically stable and pain is controlled.     MRSA is generally uncommon in UTI. Since the patient has no history of MRSA, no recent hospital admission and was a community dweller, the potential for MRSA is low. We recommend discontinuing empiric vancomycin. Agreed on continuing empiric Zosyn until OSH culture results are obtained. Zosyn will cover gram-negative microorganisms that are commonly associated with UTI and provide anaerobic coverage " for the abscess. Consider deescalating Zosyn to ceftriaxone and Flagyl if there is no evidence of Pseudomonas on cultures. If urology decides to drain the abscess, we recommend culturing the abcess to help guide antibiotic therapy.    Recommendations:  Discontinue vancomycin   Continue Zosyn, pending OSH and RP abscess cultures     Obtain culture of abscess if drained    Discussed with ID Staff Dr. Wicho Maher and Gianna Buitrago, PharmD, BCIDP  Yasmani Terry PharmD Candidate 2023    Vital Signs/Clinical Features:  Vitals  Report        08/03 0700 08/04 0659 08/04 0700  08/05 0659 08/05 0700  08/05 1309   Most Recent      Temp ( F)   97.5 -  99.4      96.7     96.7 (35.9) 08/05 1206    Pulse   83 -  110    74 -  87     74 08/05 1206    Resp   17 -  18      16     16 08/05 1206    BP   112/66 -  153/93    129/77 -  140/79     129/79 08/05 1206    SpO2 (%)   99 -  100      100     100 08/05 1206            Labs  Estimated Creatinine Clearance: 107.3 mL/min (based on SCr of 0.81 mg/dL).  Recent Labs   Lab Test 08/05/15  1108 10/13/15  1420 09/20/17  1009 08/04/22  2139 08/05/22  0711   CR 0.82 0.70 0.80 0.76 0.81       Recent Labs   Lab Test 08/05/15  1108 10/13/15  1420 08/04/22  2139 08/05/22  0711   WBC 9.9 6.9 9.6 9.5   HGB 8.7* 11.7 9.7* 9.0*   HCT 28.8* 35.8 31.9* 30.6*   MCV 71* 78 77* 79   * 199 406 342                         Culture Results:  7-Day Micro Results       ** No results found for the last 168 hours. **            Recent Labs   Lab Test 08/05/15  0930 09/25/15  1535 05/09/22  1559 07/28/22  1129   URINEPH 5.5 5.0 6.5 6.0   NITRITE Negative Negative Negative Negative   LEUKEST Large* Small* Negative Moderate*   WBCU >182* 6* 4 25-50*                         Imaging: No results found.

## 2022-08-05 NOTE — PLAN OF CARE
Goal Outcome Evaluation:    Plan of Care Reviewed With: patient     Overall Patient Progress: improving    Outcome Evaluation: Reports R flank pain, decreased by oxy and tylenol. Up ad tamir. A&Ox4. Regular diet, good appetite. L PIV infusing vanco. VSS on RA.    Discharged to: home  Transportation: to be picked up by mother  Time: to be discharged following pre-op assessment  Prescriptions: to be picked up at discharge pharmacy  Belongings: returned to patient at discharge  PIV/Access: removed at discharge  Care Plan and Education discontinued: completed  Paperwork: reviewed by patient, all questions answered

## 2022-08-05 NOTE — CONSULTS
[facility], [place]  Pre-operative Consult Note - Hospitalist Service, Gold       Date of Admission:  8/4/2022  Consult Requested by: Urology, Dr. Garay    Reason for Consult: I was asked by Dr. Orellana to consult on this patient for preoperative assessment for this patient undergoing R sided radical nephrectomy     Assessment & Plan:  Afua Galvan is a 42 year old year old female with history of renal pelvis stone and right sided xanthogranulomatous pyelonephritis as well as recently diagnosed UTI who was transferred from [facility] to evaluate for worsening R flank pain and concern for developing R RP abscess.  Urology deemed a drain or other urgent intervention was not required this admission and the plan will be to proceed with radical R nephrectomy as planned on Monday 8/8.  The patient missed her pre-operative H+P therefore internal medicine has been consulted for pre-operative clearance prior to discharge today.      #Preoperative risk assessment: EKG today shows normal sinus rhythm. No recent echocardiogram.  Patient is euvolemic at this time and has been at quite good for age functional status prior to admission. Denies any history of chest pain. Able to ambulate around the house, fold laundry, etc without dyspnea or chest pain.      Patient's cardiac risk by revised cardiac risk index is 3.9%. Her chronic conditions make her ASA class I. ACS NSQIP lists her cardiac risk similarly at 0%. Her risk of any complication is 6.7%, serious complications 5%, death 0%.  She has not required urgent intubation in the past.  It is certainly possible that she might require a longer time to recover from anesthesia than normal.     She is currently euvolemic and is/is not on supplemental oxygen. She not showing any signs/sx of any other active process at this time.     I do not think that any further testing is indicated at this time as all of her chronic issues seem to be  maximized. I discussed the risks listed above with the patient and my recommendations for no further testing or change in treatment prior to proceeding with surgery. Resume medications Losartan and Metoprolol as soon as possible post-operatively.     As all of her medical conditions are at their baseline will signoff at this time. Please contact the triage hospitalist if you need medicine to be involved at any time in the future.     PLAN:  - Patient should continue her Metoprolol the morning of her surgery, she should HOLD her Losartan day of surgery.     The patient's care was discussed with the Bedside Nurse, Patient and Primary team.    Kylah Mcintyre, KENNETH  [facility], [place]  Pager: [phone]  Please see sticky note for cross cover information  ______________________________________________________________________    Chief Complaint   Flank pain    History is obtained from the patient    History of Present Illness   Afua Galvan is a 42 year old year old female with history of renal pelvis stone and right sided xanthogranulomatous pyelonephritis as well as recently diagnosed UTI who was transferred from [facility] to evaluate for worsening R flank pain and concern for developing R RP abscess.  Urology deemed a drain or other urgent intervention was not required this admission and the plan will be to proceed with radical R nephrectomy as planned on Monday 8/8.  The patient missed her pre-operative H+P therefore internal medicine has been consulted for pre-operative clearance prior to discharge today.     Patient was feeling at her quite functional baseline prior to admission.  She lives with her mother and brother and she is independent. She is able to walk around the house without assistanc. She is able to perform ADL's such as laundry, shopping. She does not have any recollection of ever being limited by dyspnea or chest pain. She cannot recall ever having  chest pain. She has not had any fevers, rashes, N/V/D.    Review of Systems   The 10 point Review of Systems is negative other than noted in the HPI or here.     Past Medical History    I have reviewed this patient's medical history and updated it with pertinent information if needed.   Past Medical History:   Diagnosis Date     Anemia      BMI 32.0-32.9,adult 10/12/2015     Calculus of right kidney 10/6/2015     Cervical high risk HPV (human papillomavirus) test positive 5/20/16 5/20/16 NIL/+ HR HPV (not 16 or 18)     Hypertension      Xanthogranulomatous pyelonephritis 10/6/2015          Past Surgical History   I have reviewed this patient's surgical history and updated it with pertinent information if needed.  Past Surgical History:   Procedure Laterality Date     COMBINED CYSTOSCOPY, RETROGRADES, URETEROSCOPY, INSERT STENT Right 8/10/2015    Procedure: COMBINED CYSTOSCOPY, RETROGRADES, URETEROSCOPY, INSERT STENT;  Surgeon: Radha Garcia MD;  Location: [facility] OR     LASER HOLMIUM LITHOTRIPSY URETER(S), INSERT STENT, COMBINED Right 10/19/2015    Procedure: COMBINED CYSTOSCOPY, URETEROSCOPY, LASER HOLMIUM LITHOTRIPSY URETER(S), INSERT STENT;  Surgeon: Radha Garcia MD;  Location: [facility] OR        Social History   I have reviewed this patient's social history and updated it with pertinent information if needed.    Tobacco Abuse: None   Alcohol Abuse: Rarely  Drug Abuse: None       Family History   I have reviewed this patient's family history and updated it with pertinent information if needed.   History reviewed. No pertinent family history.    Medications   I have reviewed this patient's current medications    Allergies   Allergies   Allergen Reactions     Levofloxacin Rash       Physical Exam   Vital Signs: Temp: 98.2  F (36.8  C) Temp src: Oral BP: 120/61 Pulse: 85 Heart Rate: 90 Resp: 20 SpO2: 94 % O2 Device: Nasal cannula Oxygen Delivery: 2 LPM  Weight: 229 lbs 4.8 oz    General Appearance:  NAD, sitting up in bed watching TV on ipad.    Eyes: PERRLA  HEENT: Atraumatic, normocephalic.  Trachea midline.   Respiratory: CTAB, normal effort on RA.  No wheezes, rales or rhonchi.   Cardiovascular: RRR, S1S2.  No murmurs.   GI: Abdomen soft, NTND.  Skin: No jaundice, no open wounds.    Musculoskeletal: No joint swelling or tenderness.   Neurologic: A+O x 3  Psychiatric: Mood and affect appropriate    Data   I personally reviewed the EKG tracing showing normal sinus rhythm. .

## 2022-08-05 NOTE — H&P
Urology Consult    Name: Afua Galvan    MRN: [id number]   YOB: 1980               Chief Complaint:   Right xanthogranulomatous pyelonephritis     History is obtained from the patient and chart review          History of Present Illness:   Afua Galvan is a 42 year old female with PMH of htn, xanthogranulomatous pyelonephritis, who presents as a transfer from [facility] for concern of newly developed right retroperitoneal abscess.    Ms. Galvan was originally seen in 2015 where she presented with a a 2.5cm obstructing right renal pelvis stone and right sided xanthogranulomatous pyelonephritis. Renogram demonstrated <5% renal function of the right kidney and she was recommend by Dr. Garcia to undergo a right nephrectomy. Ms. Galvan strongly desired all endoscopic management be exhausted prior to pursuing his, and as such, she underwent ureteroscopy with repeat imaging demonstrating a residual 6 and 9mm stone 11/2015.    Since then she was doing well until 03/2022 where she developed new onset right flank pain. 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 She was seen by Dr. Orellana 04/29/2022 where he recommend R robotic nephrectomy, it was decided to proceed. A repeat renogram demonstrated 3% function of the right kidney.    More recently, she presented to the ED at [facility] 08/03/2022. Records are on paper and in her paper chart on the floor. We do not have her imaging or updated labs/cultures.    She states that over the past 3 weeks she has had right flank pain. She presented a week ago to her PCP who diagnosed her with a UTI and started her on bactrim, although her UCx was ultimately 10-50k mixed urogenital robert. She presented 08/03  "because she developed significant flank pain that came in waves, and she needed assistance with pain control and was concerned something was wrong. At the OSH she underwent a CT scan which demonstrated per their read \"Complex fluid attenuation along the lower pole of the right kidney is present extending into the R retroperitoneal space as well as into adjacent right psoas muscle. The collection within the R psoas muscle measured approximately 3.7cm x 3.1cm.\"    While in the ED she received ceftriaxone, and she required x1 dose of oxycodone. Her labs demonstrated:    LA 0.9, Hg 10.2, WBC 9, Cr 0.96, UA esterase moderate, UCx pending    After discussions w/ Dr. Alonso it was decided to transfer her to the [facility] for further management of pain and possible abscess in preparation for her R robotic nephrectomy that is scheduled for Monday 08/08/2022.    She currently feels well. She denies fevers, chills, flank pain, dysuria, frequency, urgency, incomplete bladder emptying, chest pain, SOB. She does feel anxious, and she is requesting atarax.                   Past Medical History:     Past Medical History:   Diagnosis Date     Anemia      BMI 32.0-32.9,adult 10/12/2015     Calculus of right kidney 10/6/2015     Cervical high risk HPV (human papillomavirus) test positive 5/20/16 5/20/16 NIL/+ HR HPV (not 16 or 18)     Hypertension      Xanthogranulomatous pyelonephritis 10/6/2015            Past Surgical History:     Past Surgical History:   Procedure Laterality Date     COMBINED CYSTOSCOPY, RETROGRADES, URETEROSCOPY, INSERT STENT Right 8/10/2015    Procedure: COMBINED CYSTOSCOPY, RETROGRADES, URETEROSCOPY, INSERT STENT;  Surgeon: Radha Garcia MD;  Location: [facility] OR     LASER HOLMIUM LITHOTRIPSY URETER(S), INSERT STENT, COMBINED Right 10/19/2015    Procedure: COMBINED CYSTOSCOPY, URETEROSCOPY, LASER HOLMIUM LITHOTRIPSY URETER(S), INSERT STENT;  Surgeon: Radha Garcia MD;  Location:  OR         "    Social History:     Social History     Tobacco Use     Smoking status: Never Smoker     Smokeless tobacco: Never Used   Substance Use Topics     Alcohol use: Yes     Comment: 1-2 maybe            Family History:     Family History   Problem Relation Age of Onset     Hypertension Mother      Lung Cancer Father      Cancer Father      Diabetes No family hx of      Coronary Artery Disease No family hx of      Hyperlipidemia No family hx of      Cerebrovascular Disease No family hx of      Breast Cancer No family hx of      Colon Cancer No family hx of      Prostate Cancer No family hx of      Other Cancer No family hx of      Depression No family hx of      Anxiety Disorder No family hx of      Mental Illness No family hx of      Substance Abuse No family hx of      Anesthesia Reaction No family hx of      Asthma No family hx of      Osteoporosis No family hx of      Genetic Disorder No family hx of      Thyroid Disease No family hx of      Obesity No family hx of             Allergies:   No Known Allergies         Medications:     Current Facility-Administered Medications   Medication     [START ON 8/7/2022] acetaminophen (TYLENOL) tablet 650 mg     bisacodyl (DULCOLAX) suppository 10 mg     calcium carbonate (TUMS) chewable tablet 500 mg     hydrOXYzine (ATARAX) tablet 25 mg     lidocaine (LMX4) cream     lidocaine 1 % 0.1-1 mL     [START ON 8/5/2022] losartan (COZAAR) tablet 50 mg     magnesium hydroxide (MILK OF MAGNESIA) suspension 30 mL     melatonin tablet 3 mg     metoprolol tartrate (LOPRESSOR) tablet 100 mg     naloxone (NARCAN) injection 0.2 mg    Or     naloxone (NARCAN) injection 0.4 mg    Or     naloxone (NARCAN) injection 0.2 mg    Or     naloxone (NARCAN) injection 0.4 mg     ondansetron (ZOFRAN ODT) ODT tab 4 mg    Or     ondansetron (ZOFRAN) injection 4 mg     oxyCODONE (ROXICODONE) tablet 5 mg    Or     oxyCODONE IR (ROXICODONE) tablet 10 mg     [START ON 8/5/2022] polyethylene glycol (MIRALAX)  "Packet 17 g     prochlorperazine (COMPAZINE) injection 10 mg    Or     prochlorperazine (COMPAZINE) tablet 10 mg     senna-docusate (SENOKOT-S/PERICOLACE) 8.6-50 MG per tablet 1 tablet     sodium chloride (PF) 0.9% PF flush 3 mL     sodium chloride (PF) 0.9% PF flush 3 mL             Review of Systems:    ROS: 10 point ROS neg other than the symptoms noted above in the HPI           Physical Exam:   VS:  T: 99    HR: 110    BP: 148/97    RR: 18   GEN:  AOx3.  NAD.    CV:  RRR  LUNGS: Non-labored breathing.   BACK:  No midline or CVA tenderness.  ABD:  Soft.  NT.  ND.  No rebound or guarding.  No masses.          Data:   All laboratory data reviewed:    Recent Labs   Lab 08/04/22  2139   WBC 9.6   HGB 9.7*        No lab results found in last 7 days.  No lab results found in last 7 days.    Invalid input(s): URINEBLOOD    All pertinent imaging reviewed:    CT scan of the abdomen:      OSH CT not in system, per OS records from CT 08/03/2022:\\"Complex fluid attenuation along the lower pole of the right kidney is present extending into the R retroperitoneal space as well as into adjacent right psoas muscle. The collection within the R psoas muscle measured approximately 3.7cm x 3.1cm.  Renal ultrasound:               Impression and Plan:   Impression:   Afua Galvan is a 42 year old female with PMH of htn, xanthogranulomatous pyelonephritis, who presents as a transfer from [facility] for concern of newly developed right retroperitoneal abscess.    At this time Ms. Galvan is clinically stable, she has no leukocytosis, her Cr is baseline, she is not having signs of symptoms of infection. However, her CT read from the OSH is concerning for possible abscess which is common in the setting of chronic xanthogranulomatous pyelonephritis and could explain her newly developed flank pain over the past few weeks.     We will plan while she is here to obtain her OSH CT and/or re-CT her tomorrow in order to " better evaluate the course of this retroperitoneal fluid collection. Ultimately she appears so well clinically, that if her cultures are sensitive to oral abx and her pain remains controled on PO oxycodone, she could be discharged home with an oral regimen in preparation for her surgery Monday. We will discuss with Dr. Orellana once we have her imaging results if this fluid collection would change his desired timing for surgery.        Plan:  -admit to urology  -home BP medications ordered  -regular diet  -repeat labs today, trend daily WBC and BMP  -vanc zosyn while in house, will request OSH cultures in the AM  -will request OSH CT in AM, if unavailable will repeat CT in house  -will touch base with Dr. Orellana in AM to discuss surgical planning       This patient's exam findings, labs, and imaging discussed with urology staff surgeon Dr. Oniel Alonso MD, who developed the treatment plan.    Vicente Rodriguez MD  Urology Resident

## 2022-08-05 NOTE — PLAN OF CARE
Admitted/transferred from:  ED  2 RN skin assessment completed by Barb Post, DIANE and Alesha MATHEWS  Skin assessment finding: skin is intact. No deficits noted  Interventions/actions: No interventions needed    Will continue to monitor.

## 2022-08-05 NOTE — UTILIZATION REVIEW
Admission Status; Secondary Review Determination         Under the authority of the Utilization Management Committee, the utilization review process indicated a secondary review on the above patient.  The review outcome is based on review of the medical records, discussions with staff, and applying clinical experience noted on the date of the review.        (x)    Conditional  Inpatient Status Appropriate - This patient's medical care is consistent with medical management for inpatient care and reasonable inpatient medical practice.      \    RATIONALE FOR DETERMINATION   The patient is a 42-year-old female admitted on 8/4/2022.  Patient was transferred from [facility] because of newly developed right retroperitoneal abscess which will likely require surgical procedure to drain.  The past history is significant for an obstructing right renal pelvis stone requiring ureteroscopy with repeat imaging residual stone.  After developing right flank pain the patient underwent a CT scan demonstrating a atrophic right kidney with severe hydronephrosis and perinephric fat stranding with chronic patient was started on ceftriaxone.  Current white count is 9 and hemoglobin is 10.2.  Patient has complex fluid along the lower pole of the right kidney extending into the right retroperitoneal space as well as to the right psoas muscle.  Patient is scheduled for surgery in 3 days.  The possibility of discharge to home and return for surgery is mentioned in the chart.  Conditional review is observation status if the patient is discharged today due to 1 midnight stay.  If the patient is held over the weekend because of clinical indications, would recommend inpatient status be continued.      The severity of illness, intensity of service provided, expected LOS and risk for adverse outcome make the care complex, high risk and appropriate for hospital admission.        The information on this document is developed by the  utilization review team in order for the business office to ensure compliance.  This only denotes the appropriateness of proper admission status and does not reflect the quality of care rendered.         The definitions of Inpatient Status and Observation Status used in making the determination above are those provided in the CMS Coverage Manual, Chapter 1 and Chapter 6, section 70.4.      Sincerely,     Tiago Jewell MD  Physician Advisor  Utilization Review/ Case Management  [facility].

## 2022-08-05 NOTE — PHARMACY-VANCOMYCIN DOSING SERVICE
"Pharmacy Vancomycin Initial Note  Date of Service 2022  Patient's  1980  42 year old, female    Indication: Urinary Tract Infection    Current estimated CrCl = Estimated Creatinine Clearance: 114.4 mL/min (based on SCr of 0.76 mg/dL).    Creatinine for last 3 days  2022:  9:39 PM Creatinine 0.76 mg/dL    Recent Vancomycin Level(s) for last 3 days  No results found for requested labs within last 72 hours.      Vancomycin IV Administrations (past 72 hours)      No vancomycin orders with administrations in past 72 hours.                Nephrotoxins and other renal medications (From now, onward)    Start     Dose/Rate Route Frequency Ordered Stop    22 2300  piperacillin-tazobactam (ZOSYN) 3.375 g vial to attach to  mL bag        Note to Pharmacy: For [facility], [facility] and [facility]: For Zosyn-naive patients, use the \"Zosyn initial dose + extended infusion\" order panel.    3.375 g  over 30 Minutes Intravenous EVERY 6 HOURS 22 2237            Contrast Orders - past 72 hours (72h ago, onward)    None          [employer] Prediction of Planned Initial Vancomycin Regimen  Loading dose: 2000 mg at 00:00 2022.  Regimen: 1250 mg IV every 12 hours.  Start time: 23:14 on 2022  Exposure target: AUC24 (range)400-600 mg/L.hr   AUC24,ss: 468 mg/L.hr  Probability of AUC24 > 400: 66 %  Ctrough,ss: 14.4 mg/L  Probability of Ctrough,ss > 20: 25 %  Probability of nephrotoxicity (Lodise SYDNEE ): 10 %        Plan:  1. Load vancomycin 2000mg once now  2. Followed by vancomycin  1250 mg IV q12h, starting  @ 1200.   3. Vancomycin monitoring method: AUC  4. Vancomycin therapeutic monitoring goal: 400-600 mg*h/L  5. Pharmacy will check vancomycin levels as appropriate in 1-3 Days.    6. Serum creatinine levels will be ordered daily for the first week of therapy and at least twice weekly for subsequent weeks.      Sanjay Karimi, [facility]  "

## 2022-08-06 LAB
ATRIAL RATE - MUSE: 85 BPM
DIASTOLIC BLOOD PRESSURE - MUSE: NORMAL MMHG
INTERPRETATION ECG - MUSE: NORMAL
P AXIS - MUSE: 45 DEGREES
PR INTERVAL - MUSE: 150 MS
QRS DURATION - MUSE: 92 MS
QT - MUSE: 370 MS
QTC - MUSE: 440 MS
R AXIS - MUSE: 0 DEGREES
SYSTOLIC BLOOD PRESSURE - MUSE: NORMAL MMHG
T AXIS - MUSE: 48 DEGREES
VENTRICULAR RATE- MUSE: 85 BPM

## 2022-08-07 ENCOUNTER — ANESTHESIA EVENT (OUTPATIENT)
Dept: SURGERY | Facility: CLINIC | Age: 42
End: 2022-08-07
Payer: COMMERCIAL

## 2022-08-08 ENCOUNTER — HOSPITAL ENCOUNTER (INPATIENT)
Facility: CLINIC | Age: 42
LOS: 1 days | Discharge: HOME | End: 2022-08-09
Attending: UROLOGY | Admitting: UROLOGY
Payer: COMMERCIAL

## 2022-08-08 ENCOUNTER — ANESTHESIA (OUTPATIENT)
Dept: SURGERY | Facility: CLINIC | Age: 42
End: 2022-08-08
Payer: COMMERCIAL

## 2022-08-08 DIAGNOSIS — N11.8 XANTHOGRANULOMATOUS PYELONEPHRITIS: ICD-10-CM

## 2022-08-08 DIAGNOSIS — N20.0 CALCULUS OF RIGHT KIDNEY: Primary | ICD-10-CM

## 2022-08-08 LAB
ABO/RH(D): NORMAL
ANION GAP SERPL CALCULATED.3IONS-SCNC: 11 MMOL/L (ref 7–15)
ANTIBODY SCREEN: NEGATIVE
BASOPHILS # BLD AUTO: 0 10E3/UL (ref 0–0.2)
BASOPHILS NFR BLD AUTO: 0 %
BUN SERPL-MCNC: 9.1 MG/DL (ref 6–20)
CALCIUM SERPL-MCNC: 8.9 MG/DL (ref 8.6–10)
CHLORIDE SERPL-SCNC: 105 MMOL/L (ref 98–107)
CREAT SERPL-MCNC: 0.68 MG/DL (ref 0.51–0.95)
DEPRECATED HCO3 PLAS-SCNC: 23 MMOL/L (ref 22–29)
EOSINOPHIL # BLD AUTO: 0 10E3/UL (ref 0–0.7)
EOSINOPHIL NFR BLD AUTO: 0 %
ERYTHROCYTE [DISTWIDTH] IN BLOOD BY AUTOMATED COUNT: 15.9 % (ref 10–15)
GFR SERPL CREATININE-BSD FRML MDRD: >90 ML/MIN/1.73M2
GLUCOSE BLDC GLUCOMTR-MCNC: 125 MG/DL (ref 70–99)
GLUCOSE SERPL-MCNC: 164 MG/DL (ref 70–99)
GRAM STAIN RESULT: NORMAL
GRAM STAIN RESULT: NORMAL
HCT VFR BLD AUTO: 29.9 % (ref 35–47)
HGB BLD-MCNC: 9.1 G/DL (ref 11.7–15.7)
HOLD SPECIMEN: NORMAL
IMM GRANULOCYTES # BLD: 0 10E3/UL
IMM GRANULOCYTES NFR BLD: 0 %
LYMPHOCYTES # BLD AUTO: 0.7 10E3/UL (ref 0.8–5.3)
LYMPHOCYTES NFR BLD AUTO: 6 %
MAGNESIUM SERPL-MCNC: 2 MG/DL (ref 1.7–2.3)
MCH RBC QN AUTO: 23.9 PG (ref 26.5–33)
MCHC RBC AUTO-ENTMCNC: 30.4 G/DL (ref 31.5–36.5)
MCV RBC AUTO: 79 FL (ref 78–100)
MONOCYTES # BLD AUTO: 0.2 10E3/UL (ref 0–1.3)
MONOCYTES NFR BLD AUTO: 2 %
NEUTROPHILS # BLD AUTO: 9.9 10E3/UL (ref 1.6–8.3)
NEUTROPHILS NFR BLD AUTO: 92 %
NRBC # BLD AUTO: 0 10E3/UL
NRBC BLD AUTO-RTO: 0 /100
PLATELET # BLD AUTO: 364 10E3/UL (ref 150–450)
POTASSIUM SERPL-SCNC: 4.2 MMOL/L (ref 3.4–5.3)
RBC # BLD AUTO: 3.8 10E6/UL (ref 3.8–5.2)
SODIUM SERPL-SCNC: 139 MMOL/L (ref 136–145)
SPECIMEN EXPIRATION DATE: NORMAL
WBC # BLD AUTO: 10.8 10E3/UL (ref 4–11)

## 2022-08-08 PROCEDURE — 87070 CULTURE OTHR SPECIMN AEROBIC: CPT | Performed by: UROLOGY

## 2022-08-08 PROCEDURE — 999N000141 HC STATISTIC PRE-PROCEDURE NURSING ASSESSMENT: Performed by: UROLOGY

## 2022-08-08 PROCEDURE — 80048 BASIC METABOLIC PNL TOTAL CA: CPT | Performed by: STUDENT IN AN ORGANIZED HEALTH CARE EDUCATION/TRAINING PROGRAM

## 2022-08-08 PROCEDURE — 258N000003 HC RX IP 258 OP 636: Performed by: ANESTHESIOLOGY

## 2022-08-08 PROCEDURE — 250N000013 HC RX MED GY IP 250 OP 250 PS 637: Performed by: STUDENT IN AN ORGANIZED HEALTH CARE EDUCATION/TRAINING PROGRAM

## 2022-08-08 PROCEDURE — 370N000017 HC ANESTHESIA TECHNICAL FEE, PER MIN: Performed by: UROLOGY

## 2022-08-08 PROCEDURE — 120N000002 HC R&B MED SURG/OB UMMC

## 2022-08-08 PROCEDURE — 83735 ASSAY OF MAGNESIUM: CPT | Performed by: STUDENT IN AN ORGANIZED HEALTH CARE EDUCATION/TRAINING PROGRAM

## 2022-08-08 PROCEDURE — 50546 LAPAROSCOPIC NEPHRECTOMY: CPT | Mod: RT | Performed by: UROLOGY

## 2022-08-08 PROCEDURE — 87077 CULTURE AEROBIC IDENTIFY: CPT | Performed by: UROLOGY

## 2022-08-08 PROCEDURE — 250N000025 HC SEVOFLURANE, PER MIN: Performed by: UROLOGY

## 2022-08-08 PROCEDURE — 250N000011 HC RX IP 250 OP 636

## 2022-08-08 PROCEDURE — 250N000011 HC RX IP 250 OP 636: Performed by: STUDENT IN AN ORGANIZED HEALTH CARE EDUCATION/TRAINING PROGRAM

## 2022-08-08 PROCEDURE — 36415 COLL VENOUS BLD VENIPUNCTURE: CPT | Performed by: ANESTHESIOLOGY

## 2022-08-08 PROCEDURE — 250N000011 HC RX IP 250 OP 636: Performed by: ANESTHESIOLOGY

## 2022-08-08 PROCEDURE — 85025 COMPLETE CBC W/AUTO DIFF WBC: CPT | Performed by: STUDENT IN AN ORGANIZED HEALTH CARE EDUCATION/TRAINING PROGRAM

## 2022-08-08 PROCEDURE — 250N000011 HC RX IP 250 OP 636: Performed by: UROLOGY

## 2022-08-08 PROCEDURE — 87205 SMEAR GRAM STAIN: CPT | Performed by: UROLOGY

## 2022-08-08 PROCEDURE — 360N000080 HC SURGERY LEVEL 7, PER MIN: Performed by: UROLOGY

## 2022-08-08 PROCEDURE — 250N000009 HC RX 250: Performed by: ANESTHESIOLOGY

## 2022-08-08 PROCEDURE — 272N000001 HC OR GENERAL SUPPLY STERILE: Performed by: UROLOGY

## 2022-08-08 PROCEDURE — 88307 TISSUE EXAM BY PATHOLOGIST: CPT | Mod: TC | Performed by: UROLOGY

## 2022-08-08 PROCEDURE — 86850 RBC ANTIBODY SCREEN: CPT | Performed by: ANESTHESIOLOGY

## 2022-08-08 PROCEDURE — 0TT04ZZ RESECTION OF RIGHT KIDNEY, PERCUTANEOUS ENDOSCOPIC APPROACH: ICD-10-PCS | Performed by: UROLOGY

## 2022-08-08 PROCEDURE — 8E0W4CZ ROBOTIC ASSISTED PROCEDURE OF TRUNK REGION, PERCUTANEOUS ENDOSCOPIC APPROACH: ICD-10-PCS | Performed by: UROLOGY

## 2022-08-08 PROCEDURE — 88307 TISSUE EXAM BY PATHOLOGIST: CPT | Mod: 26 | Performed by: PATHOLOGY

## 2022-08-08 PROCEDURE — 710N000010 HC RECOVERY PHASE 1, LEVEL 2, PER MIN: Performed by: UROLOGY

## 2022-08-08 PROCEDURE — 250N000013 HC RX MED GY IP 250 OP 250 PS 637: Performed by: ANESTHESIOLOGY

## 2022-08-08 PROCEDURE — 86901 BLOOD TYPING SEROLOGIC RH(D): CPT | Performed by: ANESTHESIOLOGY

## 2022-08-08 RX ORDER — ONDANSETRON 4 MG/1
4 TABLET, ORALLY DISINTEGRATING ORAL EVERY 30 MIN PRN
Status: DISCONTINUED | OUTPATIENT
Start: 2022-08-08 | End: 2022-08-08 | Stop reason: HOSPADM

## 2022-08-08 RX ORDER — DEXAMETHASONE SODIUM PHOSPHATE 4 MG/ML
INJECTION, SOLUTION INTRA-ARTICULAR; INTRALESIONAL; INTRAMUSCULAR; INTRAVENOUS; SOFT TISSUE PRN
Status: DISCONTINUED | OUTPATIENT
Start: 2022-08-08 | End: 2022-08-08

## 2022-08-08 RX ORDER — BUPIVACAINE HYDROCHLORIDE 2.5 MG/ML
INJECTION, SOLUTION INFILTRATION; PERINEURAL PRN
Status: DISCONTINUED | OUTPATIENT
Start: 2022-08-08 | End: 2022-08-08 | Stop reason: HOSPADM

## 2022-08-08 RX ORDER — SODIUM CHLORIDE, SODIUM LACTATE, POTASSIUM CHLORIDE, CALCIUM CHLORIDE 600; 310; 30; 20 MG/100ML; MG/100ML; MG/100ML; MG/100ML
INJECTION, SOLUTION INTRAVENOUS CONTINUOUS
Status: DISCONTINUED | OUTPATIENT
Start: 2022-08-08 | End: 2022-08-08 | Stop reason: HOSPADM

## 2022-08-08 RX ORDER — NALOXONE HYDROCHLORIDE 0.4 MG/ML
0.4 INJECTION, SOLUTION INTRAMUSCULAR; INTRAVENOUS; SUBCUTANEOUS
Status: DISCONTINUED | OUTPATIENT
Start: 2022-08-08 | End: 2022-08-09 | Stop reason: HOSPADM

## 2022-08-08 RX ORDER — HYDROMORPHONE HCL IN WATER/PF 6 MG/30 ML
0.2 PATIENT CONTROLLED ANALGESIA SYRINGE INTRAVENOUS
Status: DISCONTINUED | OUTPATIENT
Start: 2022-08-08 | End: 2022-08-09 | Stop reason: HOSPADM

## 2022-08-08 RX ORDER — PROPOFOL 10 MG/ML
INJECTION, EMULSION INTRAVENOUS PRN
Status: DISCONTINUED | OUTPATIENT
Start: 2022-08-08 | End: 2022-08-08

## 2022-08-08 RX ORDER — AMOXICILLIN 250 MG
1 CAPSULE ORAL 2 TIMES DAILY
Status: DISCONTINUED | OUTPATIENT
Start: 2022-08-08 | End: 2022-08-09 | Stop reason: HOSPADM

## 2022-08-08 RX ORDER — HEPARIN SODIUM 5000 [USP'U]/.5ML
5000 INJECTION, SOLUTION INTRAVENOUS; SUBCUTANEOUS
Status: COMPLETED | OUTPATIENT
Start: 2022-08-08 | End: 2022-08-08

## 2022-08-08 RX ORDER — PROCHLORPERAZINE MALEATE 5 MG
10 TABLET ORAL EVERY 6 HOURS PRN
Status: DISCONTINUED | OUTPATIENT
Start: 2022-08-08 | End: 2022-08-09 | Stop reason: HOSPADM

## 2022-08-08 RX ORDER — LIDOCAINE 40 MG/G
CREAM TOPICAL
Status: DISCONTINUED | OUTPATIENT
Start: 2022-08-08 | End: 2022-08-09 | Stop reason: HOSPADM

## 2022-08-08 RX ORDER — METOPROLOL TARTRATE 50 MG
100 TABLET ORAL 2 TIMES DAILY
Status: DISCONTINUED | OUTPATIENT
Start: 2022-08-08 | End: 2022-08-09 | Stop reason: HOSPADM

## 2022-08-08 RX ORDER — GUAIFENESIN 600 MG/1
600 TABLET, EXTENDED RELEASE ORAL 2 TIMES DAILY PRN
Status: DISCONTINUED | OUTPATIENT
Start: 2022-08-08 | End: 2022-08-09 | Stop reason: HOSPADM

## 2022-08-08 RX ORDER — ONDANSETRON 2 MG/ML
4 INJECTION INTRAMUSCULAR; INTRAVENOUS EVERY 6 HOURS PRN
Status: DISCONTINUED | OUTPATIENT
Start: 2022-08-08 | End: 2022-08-09 | Stop reason: HOSPADM

## 2022-08-08 RX ORDER — CEFAZOLIN SODIUM/WATER 2 G/20 ML
2 SYRINGE (ML) INTRAVENOUS
Status: COMPLETED | OUTPATIENT
Start: 2022-08-08 | End: 2022-08-08

## 2022-08-08 RX ORDER — CEFTRIAXONE 1 G/1
1 INJECTION, POWDER, FOR SOLUTION INTRAMUSCULAR; INTRAVENOUS EVERY 24 HOURS
Status: DISCONTINUED | OUTPATIENT
Start: 2022-08-08 | End: 2022-08-09 | Stop reason: HOSPADM

## 2022-08-08 RX ORDER — LIDOCAINE HYDROCHLORIDE 20 MG/ML
INJECTION, SOLUTION INFILTRATION; PERINEURAL PRN
Status: DISCONTINUED | OUTPATIENT
Start: 2022-08-08 | End: 2022-08-08

## 2022-08-08 RX ORDER — BISACODYL 10 MG
10 SUPPOSITORY, RECTAL RECTAL DAILY PRN
Status: DISCONTINUED | OUTPATIENT
Start: 2022-08-08 | End: 2022-08-09 | Stop reason: HOSPADM

## 2022-08-08 RX ORDER — HYDROMORPHONE HYDROCHLORIDE 1 MG/ML
0.2 INJECTION, SOLUTION INTRAMUSCULAR; INTRAVENOUS; SUBCUTANEOUS EVERY 5 MIN PRN
Status: DISCONTINUED | OUTPATIENT
Start: 2022-08-08 | End: 2022-08-08 | Stop reason: HOSPADM

## 2022-08-08 RX ORDER — LORAZEPAM 0.5 MG/1
0.25 TABLET ORAL EVERY 6 HOURS PRN
Status: DISCONTINUED | OUTPATIENT
Start: 2022-08-08 | End: 2022-08-09 | Stop reason: HOSPADM

## 2022-08-08 RX ORDER — NALOXONE HYDROCHLORIDE 0.4 MG/ML
0.2 INJECTION, SOLUTION INTRAMUSCULAR; INTRAVENOUS; SUBCUTANEOUS
Status: DISCONTINUED | OUTPATIENT
Start: 2022-08-08 | End: 2022-08-09 | Stop reason: HOSPADM

## 2022-08-08 RX ORDER — POLYETHYLENE GLYCOL 3350 17 G/17G
17 POWDER, FOR SOLUTION ORAL DAILY
Status: DISCONTINUED | OUTPATIENT
Start: 2022-08-09 | End: 2022-08-09 | Stop reason: HOSPADM

## 2022-08-08 RX ORDER — CIPROFLOXACIN 2 MG/ML
INJECTION, SOLUTION INTRAVENOUS PRN
Status: DISCONTINUED | OUTPATIENT
Start: 2022-08-08 | End: 2022-08-08

## 2022-08-08 RX ORDER — ONDANSETRON 2 MG/ML
4 INJECTION INTRAMUSCULAR; INTRAVENOUS EVERY 30 MIN PRN
Status: DISCONTINUED | OUTPATIENT
Start: 2022-08-08 | End: 2022-08-08 | Stop reason: HOSPADM

## 2022-08-08 RX ORDER — OXYCODONE HYDROCHLORIDE 5 MG/1
5 TABLET ORAL EVERY 4 HOURS PRN
Status: DISCONTINUED | OUTPATIENT
Start: 2022-08-08 | End: 2022-08-09 | Stop reason: HOSPADM

## 2022-08-08 RX ORDER — OXYCODONE HYDROCHLORIDE 5 MG/1
5 TABLET ORAL EVERY 4 HOURS PRN
Status: DISCONTINUED | OUTPATIENT
Start: 2022-08-08 | End: 2022-08-08 | Stop reason: HOSPADM

## 2022-08-08 RX ORDER — SODIUM CHLORIDE, SODIUM LACTATE, POTASSIUM CHLORIDE, CALCIUM CHLORIDE 600; 310; 30; 20 MG/100ML; MG/100ML; MG/100ML; MG/100ML
INJECTION, SOLUTION INTRAVENOUS CONTINUOUS PRN
Status: DISCONTINUED | OUTPATIENT
Start: 2022-08-08 | End: 2022-08-08

## 2022-08-08 RX ORDER — SODIUM CHLORIDE, SODIUM GLUCONATE, SODIUM ACETATE, POTASSIUM CHLORIDE AND MAGNESIUM CHLORIDE 526; 502; 368; 37; 30 MG/100ML; MG/100ML; MG/100ML; MG/100ML; MG/100ML
INJECTION, SOLUTION INTRAVENOUS CONTINUOUS PRN
Status: DISCONTINUED | OUTPATIENT
Start: 2022-08-08 | End: 2022-08-08

## 2022-08-08 RX ORDER — FENTANYL CITRATE 50 UG/ML
INJECTION, SOLUTION INTRAMUSCULAR; INTRAVENOUS PRN
Status: DISCONTINUED | OUTPATIENT
Start: 2022-08-08 | End: 2022-08-08

## 2022-08-08 RX ORDER — ONDANSETRON 2 MG/ML
INJECTION INTRAMUSCULAR; INTRAVENOUS PRN
Status: DISCONTINUED | OUTPATIENT
Start: 2022-08-08 | End: 2022-08-08

## 2022-08-08 RX ORDER — CEFAZOLIN SODIUM/WATER 2 G/20 ML
2 SYRINGE (ML) INTRAVENOUS SEE ADMIN INSTRUCTIONS
Status: DISCONTINUED | OUTPATIENT
Start: 2022-08-08 | End: 2022-08-08 | Stop reason: HOSPADM

## 2022-08-08 RX ORDER — ONDANSETRON 4 MG/1
4 TABLET, ORALLY DISINTEGRATING ORAL EVERY 6 HOURS PRN
Status: DISCONTINUED | OUTPATIENT
Start: 2022-08-08 | End: 2022-08-09 | Stop reason: HOSPADM

## 2022-08-08 RX ORDER — ACETAMINOPHEN 325 MG/1
650 TABLET ORAL EVERY 6 HOURS
Status: DISCONTINUED | OUTPATIENT
Start: 2022-08-08 | End: 2022-08-09 | Stop reason: HOSPADM

## 2022-08-08 RX ORDER — OXYCODONE HYDROCHLORIDE 10 MG/1
10 TABLET ORAL EVERY 4 HOURS PRN
Status: DISCONTINUED | OUTPATIENT
Start: 2022-08-08 | End: 2022-08-09 | Stop reason: HOSPADM

## 2022-08-08 RX ORDER — FENTANYL CITRATE 50 UG/ML
25 INJECTION, SOLUTION INTRAMUSCULAR; INTRAVENOUS EVERY 5 MIN PRN
Status: DISCONTINUED | OUTPATIENT
Start: 2022-08-08 | End: 2022-08-08 | Stop reason: HOSPADM

## 2022-08-08 RX ORDER — HYDROMORPHONE HCL IN WATER/PF 6 MG/30 ML
0.4 PATIENT CONTROLLED ANALGESIA SYRINGE INTRAVENOUS
Status: DISCONTINUED | OUTPATIENT
Start: 2022-08-08 | End: 2022-08-09 | Stop reason: HOSPADM

## 2022-08-08 RX ADMIN — Medication 50 MG: at 14:56

## 2022-08-08 RX ADMIN — ACETAMINOPHEN 650 MG: 325 TABLET, FILM COATED ORAL at 22:21

## 2022-08-08 RX ADMIN — GUAIFENESIN 600 MG: 600 TABLET ORAL at 22:37

## 2022-08-08 RX ADMIN — SODIUM CHLORIDE, POTASSIUM CHLORIDE, SODIUM LACTATE AND CALCIUM CHLORIDE: 600; 310; 30; 20 INJECTION, SOLUTION INTRAVENOUS at 17:56

## 2022-08-08 RX ADMIN — PHENYLEPHRINE HYDROCHLORIDE 100 MCG: 10 INJECTION INTRAVENOUS at 15:18

## 2022-08-08 RX ADMIN — Medication 20 MG: at 17:25

## 2022-08-08 RX ADMIN — DEXAMETHASONE SODIUM PHOSPHATE 10 MG: 4 INJECTION, SOLUTION INTRA-ARTICULAR; INTRALESIONAL; INTRAMUSCULAR; INTRAVENOUS; SOFT TISSUE at 15:18

## 2022-08-08 RX ADMIN — HEPARIN SODIUM 5000 UNITS: 5000 INJECTION, SOLUTION INTRAVENOUS; SUBCUTANEOUS at 12:16

## 2022-08-08 RX ADMIN — Medication 10 MG: at 18:31

## 2022-08-08 RX ADMIN — CEFTRIAXONE SODIUM 1 G: 1 INJECTION, POWDER, FOR SOLUTION INTRAMUSCULAR; INTRAVENOUS at 23:58

## 2022-08-08 RX ADMIN — HYDROMORPHONE HYDROCHLORIDE 0.25 MG: 1 INJECTION, SOLUTION INTRAMUSCULAR; INTRAVENOUS; SUBCUTANEOUS at 18:44

## 2022-08-08 RX ADMIN — METOPROLOL TARTRATE 100 MG: 50 TABLET, FILM COATED ORAL at 22:20

## 2022-08-08 RX ADMIN — CIPROFLOXACIN 400 MG: 2 INJECTION INTRAVENOUS at 16:28

## 2022-08-08 RX ADMIN — Medication 20 MG: at 15:25

## 2022-08-08 RX ADMIN — FENTANYL CITRATE 25 MCG: 50 INJECTION, SOLUTION INTRAMUSCULAR; INTRAVENOUS at 17:36

## 2022-08-08 RX ADMIN — SENNOSIDES AND DOCUSATE SODIUM 1 TABLET: 8.6; 5 TABLET ORAL at 22:20

## 2022-08-08 RX ADMIN — Medication 2 G: at 15:11

## 2022-08-08 RX ADMIN — SODIUM CHLORIDE, POTASSIUM CHLORIDE, SODIUM LACTATE AND CALCIUM CHLORIDE: 600; 310; 30; 20 INJECTION, SOLUTION INTRAVENOUS at 20:25

## 2022-08-08 RX ADMIN — Medication 10 MG: at 16:44

## 2022-08-08 RX ADMIN — FENTANYL CITRATE 100 MCG: 50 INJECTION, SOLUTION INTRAMUSCULAR; INTRAVENOUS at 14:54

## 2022-08-08 RX ADMIN — LIDOCAINE HYDROCHLORIDE 100 MG: 20 INJECTION, SOLUTION INFILTRATION; PERINEURAL at 14:55

## 2022-08-08 RX ADMIN — SUGAMMADEX 200 MG: 100 INJECTION, SOLUTION INTRAVENOUS at 19:31

## 2022-08-08 RX ADMIN — ONDANSETRON 4 MG: 2 INJECTION INTRAMUSCULAR; INTRAVENOUS at 19:17

## 2022-08-08 RX ADMIN — Medication 20 MG: at 16:09

## 2022-08-08 RX ADMIN — OXYCODONE HYDROCHLORIDE 5 MG: 5 TABLET ORAL at 20:36

## 2022-08-08 RX ADMIN — PROPOFOL 150 MG: 10 INJECTION, EMULSION INTRAVENOUS at 14:55

## 2022-08-08 RX ADMIN — FENTANYL CITRATE 50 MCG: 50 INJECTION, SOLUTION INTRAMUSCULAR; INTRAVENOUS at 15:32

## 2022-08-08 RX ADMIN — HYDROMORPHONE HYDROCHLORIDE 0.4 MG: 0.2 INJECTION, SOLUTION INTRAMUSCULAR; INTRAVENOUS; SUBCUTANEOUS at 22:21

## 2022-08-08 RX ADMIN — SODIUM CHLORIDE, SODIUM GLUCONATE, SODIUM ACETATE, POTASSIUM CHLORIDE AND MAGNESIUM CHLORIDE: 526; 502; 368; 37; 30 INJECTION, SOLUTION INTRAVENOUS at 14:42

## 2022-08-08 RX ADMIN — MIDAZOLAM 2 MG: 1 INJECTION INTRAMUSCULAR; INTRAVENOUS at 14:40

## 2022-08-08 RX ADMIN — Medication 2 G: at 19:19

## 2022-08-08 ASSESSMENT — ACTIVITIES OF DAILY LIVING (ADL)
ADLS_ACUITY_SCORE: 18

## 2022-08-08 NOTE — ANESTHESIA PROCEDURE NOTES
Airway       Patient location during procedure: OR       Procedure Start/Stop Times: 8/8/2022 3:00 PM  Staff -        Other Anesthesia Staff: Adria Evans       Performed By: SRNA  Consent for Airway        Urgency: elective  Indications and Patient Condition       Indications for airway management: stefani-procedural         Mask difficulty assessment: 1 - vent by mask    Final Airway Details       Final airway type: endotracheal airway       Successful airway: ETT - single  Endotracheal Airway Details        ETT size (mm): 6.5 (Pt requested a small ETT due to voice concerns)       Cuffed: yes       Successful intubation technique: direct laryngoscopy       DL Blade Type: Willson 2       Grade View of Cords: 1       Adjucts: stylet       Position: Right       Measured from: lips       Secured at (cm): 21       Bite block used: None    Post intubation assessment        Placement verified by: capnometry, equal breath sounds and chest rise        Number of attempts at approach: 1       Number of other approaches attempted: 0       Secured with: silk tape       Ease of procedure: easy       Dentition: Intact and Unchanged    Medication(s) Administered   Medication Administration Time: 8/8/2022 3:00 PM

## 2022-08-08 NOTE — ANESTHESIA PREPROCEDURE EVALUATION
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      Past Surgical History:   Procedure Laterality Date     COMBINED CYSTOSCOPY, RETROGRADES, URETEROSCOPY, INSERT STENT Right 8/10/2015    Procedure: COMBINED CYSTOSCOPY, RETROGRADES, URETEROSCOPY, INSERT STENT;  Surgeon: Radha Garcia MD;  Location: [facility] OR     LASER HOLMIUM LITHOTRIPSY URETER(S), INSERT STENT, COMBINED Right 10/19/2015    Procedure: COMBINED CYSTOSCOPY, URETEROSCOPY, LASER HOLMIUM LITHOTRIPSY URETER(S), INSERT STENT;  Surgeon: Radha Garcia MD;  Location: [facility] OR      No Known Allergies   Social History     Tobacco Use     Smoking status: Never Smoker     Smokeless tobacco: Never Used   Substance Use Topics     Alcohol use: Yes     Comment: 1-2 maybe      Wt Readings from Last 1 Encounters:   22 100.2 kg (220 lb 14.4 oz)        Anesthesia Evaluation   Pt has had prior anesthetic. Type: General.        ROS/MED HX  ENT/Pulmonary:  - neg pulmonary ROS     Neurologic:  - neg neurologic ROS     Cardiovascular:     (+) hypertension-----    METS/Exercise Tolerance: >4 METS    Hematologic:  - neg hematologic  ROS     Musculoskeletal:  - neg musculoskeletal ROS     GI/Hepatic:  - neg GI/hepatic ROS     Renal/Genitourinary:     (+) renal disease,     Endo:     (+) Obesity,     Psychiatric/Substance Use:  - neg psychiatric ROS     Infectious Disease:  - neg infectious disease ROS     Malignancy:  - neg malignancy ROS     Other:  - neg other ROS    (+) LMP: 2022, ,         Physical  Exam    Airway        Mallampati: II   TM distance: > 3 FB   Neck ROM: full   Mouth opening: > 3 cm    Respiratory Devices and Support         Dental  no notable dental history         Cardiovascular   cardiovascular exam normal          Pulmonary   pulmonary exam normal                OUTSIDE LABS:  CBC:   Lab Results   Component Value Date    WBC 9.5 08/05/2022    WBC 9.6 08/04/2022    HGB 9.0 (L) 08/05/2022    HGB 9.7 (L) 08/04/2022    HCT 30.6 (L) 08/05/2022    HCT 31.9 (L) 08/04/2022     08/05/2022     08/04/2022     BMP:   Lab Results   Component Value Date     08/05/2022     (L) 08/04/2022    POTASSIUM 4.1 08/05/2022    POTASSIUM 4.1 08/04/2022    CHLORIDE 104 08/05/2022    CHLORIDE 102 08/04/2022    CO2 24 08/05/2022    CO2 22 08/04/2022    BUN 10.5 08/05/2022    BUN 14.2 08/04/2022    CR 0.81 08/05/2022    CR 0.76 08/04/2022     (H) 08/08/2022     (H) 08/05/2022     COAGS: No results found for: PTT, INR, FIBR  POC:   Lab Results   Component Value Date    HCG Negative 10/19/2015     HEPATIC: No results found for: ALBUMIN, PROTTOTAL, ALT, AST, GGT, ALKPHOS, BILITOTAL, BILIDIRECT, FLORENTIN  OTHER:   Lab Results   Component Value Date    BRIANNA 9.1 08/05/2022    TSH 1.51 05/20/2016       Anesthesia Plan    ASA Status:  2      Anesthesia Type: General.   Induction: Intravenous.   Maintenance: Balanced.   Techniques and Equipment:     - Lines/Monitors: 2nd IV     - Blood: T&S     Consents    Anesthesia Plan(s) and associated risks, benefits, and realistic alternatives discussed. Questions answered and patient/representative(s) expressed understanding.    - Discussed:     - Discussed with:  Patient      - Extended Intubation/Ventilatory Support Discussed: Yes.      - Patient is DNR/DNI Status: No    Use of blood products discussed: Yes.     - Discussed with: Patient.     - Consented: consented to blood products            Reason for refusal: other.     Postoperative Care    Pain  management: Multi-modal analgesia.   PONV prophylaxis: Ondansetron (or other 5HT-3), Dexamethasone or Solumedrol     Comments:              PAC Discussion and Assessment    ASA Classification: 2    Anesthetic techniques and relevant risks discussed: GA  Invasive monitoring and risk discussed: Yes    Possibility and Risk of blood transfusion discussed: Yes  NPO instructions given: No solids 6 hours before surgery, stop clear liquids 2 hours before surgery    Needs early admission to pre-op area: No                                             Juana Parmar MD   [facility]

## 2022-08-08 NOTE — OP NOTE
OPERATIVE REPORT   8/8/2022  PREOPERATIVE DIAGNOSIS: Xanthogranulomatous pyelonephritis  POSTOPERATIVE DIAGNOSIS: Same  PROCEDURES PERFORMED:  Right robotic nephrectomy  STAFF SURGEON: SALONI Orellana MD,  ASSISTANT: Nneka Anton MD; Asim De Jesus MD  ANESTHESIA:  General Anesthesia  ESTIMATED BLOOD LOSS: 100 mL.   IV FLUIDS: see dictated anesthesia record  COMPLICATIONS: None.   SPECIMEN:  Right Kidney   DRAINS AND TUBES:     Yoon catheter, RUQ ARLYN drain  SIGNIFICANT FINDINGS:   - Kidney filled with purulent material, abscess tracking down the right psoas. Adrenal gland spared. 2 arteries and 2 veins.    BRIEF OPERATIVE INDICATIONS: Afua Galvan is a 42 year old year old female with a longstanding history of right nephrolithiasis and recurrent infections with imaging consistent with XGP. This kidney is non-functional on NM renal scan. A few days prior to surgery she was found to a new psoas abscess and has remained on antibiotics for the last several days. After a discussion of the risks, benefits, and alternatives to surgery along with an extensive discussion of the surgical approach (robotic vs. Open) and we opted to pursue robotic nephrectomy with the plan to convert to an open procedure if this wasn't possible due to inflammation/scarring.     OPERATIIVE DETAILS: Informed consent was obtained and the patient was brought to the operating room where general anesthesia was induced. She was given appropriate preoperative antibiotics. She was positioned in the right modified flank position where she was prepped and draped in the standard sterile fashion.  We were careful to pad all pressure points.  We then performed a timeout, verifying the correct patient, site, and procedure to be performed.   We began by inserting the Veress needle into the abdomen. After confirmatory drop test, the abdomen was insufflated. We then proceeded to place four 8 mm robotic ports on the right side of the abdomen and a 12 mm  assist port in the upper midline. An additional 5 mm port was needed for liver retraction.  The robot was then docked.   On initial inspection, there were minimal bowel adhesions. We mobilized the colon from  the lateral aspect of the abdominal wall and reflect it medially. Gerota's fascia was then opened and the lower pole of the kidney mobilized. This was extremely challenging due to a large pocket of purulence we encountered tracking inferiorly down the psoas muscle. The pocket was entered and a large volume of purulent material was expressed. A sample of this was sent for culture. The pocket was incised to allow for maximal drainage and the space was copiously irrigated. Then the lower pole was mobilized with some difficulty. The gonadal vessel was identified and divided. The ureter was also identified and divided with Hem-o-lock clips. We then moved cephalad to the renal hilum. We then identified the following vessels:  Renal Vein: 2 and Renal Artery: 2.  These were carefully dissected and freed from surrounding strctures. We divided the renal arteries and veins with a 45 mm Endo-TITUS stapler. We then continued to march up towards the upper pole where the remaining attachments were divided using cautery.  We were able to spare the adrenal, although the adrenal gland was quite adherent to the renal capsule.  We then continued to divide the lateral attachments until the kidney was completely free. At this point it was placed into a 15 mm EndoCatch bag. Hemostasis was obtained. The abdomen was then irrigated with 3 L normal saline. A ARLYN drain was placed in the RUQ. The specimen was extracted through a transverse RLQ incision and sent to pathology for permanent pathology. The extraction incision and assistant port fascia were closed with 0-Vicryl in an interrupted fashion. The skin incisions were closed with 4-0 Monocryl. The wounds were dressed with Dermabond. The patient was then awakened and taken to the PACU in  stable condition.    PLAN:   - PACU then floor  - Continue IV Ceftriaxone while in-patient, 2 weeks of Cipro on discharge (pending culture results)  - Continue ARLYN drain, trending outputs

## 2022-08-09 VITALS
DIASTOLIC BLOOD PRESSURE: 68 MMHG | TEMPERATURE: 96.2 F | WEIGHT: 220.9 LBS | BODY MASS INDEX: 34.67 KG/M2 | OXYGEN SATURATION: 93 % | SYSTOLIC BLOOD PRESSURE: 131 MMHG | HEIGHT: 67 IN | RESPIRATION RATE: 16 BRPM | HEART RATE: 85 BPM

## 2022-08-09 LAB
ANION GAP SERPL CALCULATED.3IONS-SCNC: 9 MMOL/L (ref 7–15)
BUN SERPL-MCNC: 7.4 MG/DL (ref 6–20)
CALCIUM SERPL-MCNC: 9 MG/DL (ref 8.6–10)
CHLORIDE SERPL-SCNC: 104 MMOL/L (ref 98–107)
CREAT SERPL-MCNC: 0.64 MG/DL (ref 0.51–0.95)
DEPRECATED HCO3 PLAS-SCNC: 24 MMOL/L (ref 22–29)
ERYTHROCYTE [DISTWIDTH] IN BLOOD BY AUTOMATED COUNT: 15.8 % (ref 10–15)
GFR SERPL CREATININE-BSD FRML MDRD: >90 ML/MIN/1.73M2
GLUCOSE SERPL-MCNC: 162 MG/DL (ref 70–99)
HCT VFR BLD AUTO: 27.5 % (ref 35–47)
HGB BLD-MCNC: 8.2 G/DL (ref 11.7–15.7)
MCH RBC QN AUTO: 23.3 PG (ref 26.5–33)
MCHC RBC AUTO-ENTMCNC: 29.8 G/DL (ref 31.5–36.5)
MCV RBC AUTO: 78 FL (ref 78–100)
PLATELET # BLD AUTO: 345 10E3/UL (ref 150–450)
POTASSIUM SERPL-SCNC: 4.1 MMOL/L (ref 3.4–5.3)
RBC # BLD AUTO: 3.52 10E6/UL (ref 3.8–5.2)
SODIUM SERPL-SCNC: 137 MMOL/L (ref 136–145)
WBC # BLD AUTO: 10.2 10E3/UL (ref 4–11)

## 2022-08-09 PROCEDURE — 36415 COLL VENOUS BLD VENIPUNCTURE: CPT | Performed by: STUDENT IN AN ORGANIZED HEALTH CARE EDUCATION/TRAINING PROGRAM

## 2022-08-09 PROCEDURE — 250N000011 HC RX IP 250 OP 636: Performed by: STUDENT IN AN ORGANIZED HEALTH CARE EDUCATION/TRAINING PROGRAM

## 2022-08-09 PROCEDURE — 85027 COMPLETE CBC AUTOMATED: CPT | Performed by: STUDENT IN AN ORGANIZED HEALTH CARE EDUCATION/TRAINING PROGRAM

## 2022-08-09 PROCEDURE — 250N000013 HC RX MED GY IP 250 OP 250 PS 637: Performed by: STUDENT IN AN ORGANIZED HEALTH CARE EDUCATION/TRAINING PROGRAM

## 2022-08-09 PROCEDURE — 82310 ASSAY OF CALCIUM: CPT | Performed by: STUDENT IN AN ORGANIZED HEALTH CARE EDUCATION/TRAINING PROGRAM

## 2022-08-09 RX ORDER — CIPROFLOXACIN 500 MG/1
500 TABLET, FILM COATED ORAL 2 TIMES DAILY
Qty: 28 TABLET | Refills: 0 | Status: SHIPPED | OUTPATIENT
Start: 2022-08-09 | End: 2024-01-03

## 2022-08-09 RX ORDER — OXYCODONE HYDROCHLORIDE 5 MG/1
5 TABLET ORAL EVERY 6 HOURS PRN
Qty: 12 TABLET | Refills: 0 | Status: SHIPPED | OUTPATIENT
Start: 2022-08-09 | End: 2022-08-12

## 2022-08-09 RX ORDER — AMOXICILLIN 250 MG
1 CAPSULE ORAL 2 TIMES DAILY
Qty: 20 TABLET | Refills: 0 | Status: SHIPPED | OUTPATIENT
Start: 2022-08-09 | End: 2024-01-03

## 2022-08-09 RX ADMIN — METOPROLOL TARTRATE 100 MG: 50 TABLET, FILM COATED ORAL at 08:27

## 2022-08-09 RX ADMIN — ACETAMINOPHEN 650 MG: 325 TABLET, FILM COATED ORAL at 04:06

## 2022-08-09 RX ADMIN — HYDROMORPHONE HYDROCHLORIDE 0.4 MG: 0.2 INJECTION, SOLUTION INTRAMUSCULAR; INTRAVENOUS; SUBCUTANEOUS at 04:06

## 2022-08-09 RX ADMIN — ACETAMINOPHEN 650 MG: 325 TABLET, FILM COATED ORAL at 10:20

## 2022-08-09 RX ADMIN — POLYETHYLENE GLYCOL 3350 17 G: 17 POWDER, FOR SOLUTION ORAL at 08:27

## 2022-08-09 RX ADMIN — GUAIFENESIN 600 MG: 600 TABLET ORAL at 10:20

## 2022-08-09 RX ADMIN — OXYCODONE HYDROCHLORIDE 5 MG: 5 TABLET ORAL at 00:27

## 2022-08-09 RX ADMIN — SENNOSIDES AND DOCUSATE SODIUM 1 TABLET: 8.6; 5 TABLET ORAL at 08:26

## 2022-08-09 RX ADMIN — OXYCODONE HYDROCHLORIDE 10 MG: 10 TABLET ORAL at 08:37

## 2022-08-09 ASSESSMENT — ACTIVITIES OF DAILY LIVING (ADL)
ADLS_ACUITY_SCORE: 18

## 2022-08-09 NOTE — PHARMACY-ADMISSION MEDICATION HISTORY
Admission Medication History Completed by Pharmacy    See [facility] Admission Navigator for allergy information, preferred outpatient pharmacy, prior to admission medications and immunization status.     Medication History Sources:     Surescripts fill history    Changes made to PTA medication list (reason):    Added: None    Deleted: None    Changed: None    Additional Information:    None    Prior to Admission medications    Medication Sig Last Dose Taking? Auth Provider Long Term End Date   ciprofloxacin (CIPRO) 500 MG tablet Take 1 tablet (500 mg) by mouth 2 times daily  Yes Vahe Orellana MD     LORazepam (ATIVAN) 0.5 MG tablet Take 0.5 tablets (0.25 mg) by mouth every 6 hours as needed for anxiety 8/8/2022 at 1030 Yes Brody Bone APRN CNP     losartan (COZAAR) 50 MG tablet Take 1 tablet (50 mg) by mouth daily 8/8/2022 at 1030 Yes Brody Bone APRN CNP Yes    metoprolol tartrate (LOPRESSOR) 100 MG tablet Take 100 mg by mouth 2 times daily 8/8/2022 at 1000 Yes Reported, Patient Yes                      sulfamethoxazole-trimethoprim (BACTRIM DS) 800-160 MG tablet Take 1 tablet by mouth 2 times daily Past Month at Unknown time Yes Vahe Orellana MD         Date completed: 08/09/22    Medication history completed by: Jeannette Borja [facility]

## 2022-08-09 NOTE — ANESTHESIA CARE TRANSFER NOTE
Patient: Afua Galvan    Procedure: Procedure(s):  Robotic RIGHT NEPHRECTOMY       Diagnosis: Nephrolithiasis [N20.0]  Diagnosis Additional Information: No value filed.    Anesthesia Type:   General     Note:    Oropharynx: oropharynx clear of all foreign objects  Level of Consciousness: awake  Oxygen Supplementation: face mask  Level of Supplemental Oxygen (L/min / FiO2): 6 LPM  Independent Airway: airway patency satisfactory and stable  Dentition: dentition unchanged  Vital Signs Stable: post-procedure vital signs reviewed and stable  Report to RN Given: handoff report given  Patient transferred to: PACU    Handoff Report: Identifed the Patient, Identified the Reponsible Provider, Reviewed the pertinent medical history, Discussed the surgical course, Reviewed Intra-OP anesthesia mangement and issues during anesthesia, Set expectations for post-procedure period and Allowed opportunity for questions and acknowledgement of understanding      Vitals:  Vitals Value Taken Time   /94 08/08/22 1945   Temp     Pulse 96 08/08/22 1946   Resp 22 08/08/22 1946   SpO2 96 % 08/08/22 1946   Vitals shown include unvalidated device data.    Electronically Signed By: NASH Butler CRNA  August 8, 2022  7:47 PM

## 2022-08-09 NOTE — PROGRESS NOTES
"Urology  Progress Note    NAEON. Pain controlled with PRN's. Minimal PO intake after PACU, no n/v. No flatus. In good spirits.     Exam  /78 (BP Location: Right arm)   Pulse 78   Temp (!) 96.3  F (35.7  C) (Oral)   Resp 16   Ht 1.702 m (5' 7\")   Wt 100.2 kg (220 lb 14.4 oz)   LMP 07/22/2022   SpO2 93%   BMI 34.60 kg/m    No acute distress  Unlabored breathing  Abdomen soft, appropriately tender, nondistended. Incisions c/d/i, closed with skin glue.   ARLYN serosanguinous  Yoon with clear yellow urine in tubing    /400  ARLYN 95/50    Labs  WBC (10.8)  Hgb (9.1)  Cr (0.68)    Abscess fluid -  Gram Stain Result  No organisms seen      2+ WBC seen        AM labs pending    Assessment/Plan  42 year old y/o female POD#1 s/p robotic R nephrectomy for XGP kidney with nephrolithiasis and R psoas abscess.     Neuro: sched Tylenol, oxy/Dilaudid for pain control  CV: RISHI  Pulm: incentive spirometry while awake  FEN/GI: regular diet, bowel regimen   Endo: RISHI  : Yoon removal this AM  Heme/ID: Ceftriaxone while inpatient, 2 weeks of Cipro at discharge (may adjust based on abscess fluid Gram stain/culture)  Activity: up ad tamir, encouraged ambulation  PPx: SCDs  Dispo: possible discharge today, plan to discharge with drain in place     Seen and examined with the chief resident. Will discuss with Dr. Orellana.     Contacting the Urology Team     Please use the following job codes to reach the Urology Team. Note that you must use an in house phone and that job codes cannot receive text pages.     On weekdays, dial [phone] (or star-star-star [phone] on the new [email or web address] telephones) then [phone] to reach the Adult Urology resident or PA on call    On weekdays, dial [phone] (or star-star-star 777 on the new [email or web address] telephones) then [phone] to reach the Pediatric Urology resident    On weeknights and weekends, dial [phone] (or star-star-star 777 on the new [email or web address] telephones) then [phone] to reach the Urology resident on call (for both Adult and " Pediatrics)

## 2022-08-09 NOTE — PROGRESS NOTES
Pt from, PACU at 2100. Right Nephrectomy  VSS,  A & O X 4 Sat 93% on 2L per NC per  Car HERNANDEZ patent. ARLYN bulb suction to right abdominal incisions CD & I.  MIV LR @100. Denies nausea, taking sips of clears. Oral and IV pain medications given with good relief. 2 RN skin assessment completed. Mom in room. Makes needs known. Will continue to monitor and notify provider with any changes.

## 2022-08-09 NOTE — PROGRESS NOTES
Assisting with pt care while pt in PACU   Transfer and Admit Inpatient Order placed and released   @ 2020 Lab called about results. Informed results should be available in 20-25 mins   Pt transport placed and pending

## 2022-08-09 NOTE — ANESTHESIA POSTPROCEDURE EVALUATION
Patient: Afua Galvan    Procedure: Procedure(s):  Robotic RIGHT NEPHRECTOMY       Anesthesia Type:  General    Note:  Disposition: Outpatient   Postop Pain Control: Uneventful            Sign Out: Well controlled pain   PONV: No   Neuro/Psych: Uneventful            Sign Out: Acceptable/Baseline neuro status   Airway/Respiratory: Uneventful            Sign Out: Acceptable/Baseline resp. status   CV/Hemodynamics: Uneventful            Sign Out: Acceptable CV status; No obvious hypovolemia; No obvious fluid overload   Other NRE: NONE   DID A NON-ROUTINE EVENT OCCUR? No           Last vitals:  Vitals Value Taken Time   /79 08/08/22 2115   Temp 36.7  C (98  F) 08/08/22 2100   Pulse 85 08/08/22 2116   Resp 20 08/08/22 2116   SpO2 95 % 08/08/22 2116   Vitals shown include unvalidated device data.    Electronically Signed By: Shawn Dumont MD  August 9, 2022  8:04 AM

## 2022-08-09 NOTE — BRIEF OP NOTE
[facility]    Brief Operative Note    Pre-operative diagnosis: Nephrolithiasis [N20.0]  Post-operative diagnosis Same as pre-operative diagnosis    Procedure: Procedure(s):  Robotic RIGHT NEPHRECTOMY  Surgeon: Surgeon(s) and Role:     * Vahe Orellana MD - Primary     * Nneka Anton MD - Fellow - Assisting  Anesthesia: General   Estimated Blood Loss: 100mL    Drains: RLQ Arjun-Lee, 16 Fr toro catheter  Specimens:   ID Type Source Tests Collected by Time Destination   1 : Right Kidney Tissue Kidney, Right SURGICAL PATHOLOGY EXAM Vahe Orellana MD 8/8/2022  6:48 PM    A : Kidney Abscess Abscess Kidney, Right GRAM STAIN, AEROBIC BACTERIAL CULTURE ROUTINE Vahe Orellana MD 8/8/2022  4:41 PM      Findings:   Kidney filled with purulent material, abscess tracking down the right psoas. Adrenal gland spared. 2 arteries and 2 veins.  Complications: None.  Implants: * No implants in log *  Dispo:  - PACU then floor  - Ceftriazone while in-patient, then 2 weeks Cipro at discharge  - Drain plan pending outputs  - Toro removal POD#1    Nneka Anton MD  PGY-5 Urology  Pager [phone]

## 2022-08-09 NOTE — DISCHARGE SUMMARY
Discharge Summary     Afua Galvan MRN# [id number]   YOB: 1980 Age: 42 year old     Date of Admission:  8/8/2022  Date of Discharge::  8/9/2022  Admitting Physician:  Vahe Orellana MD  Discharge Physician:  Asim De Jesus MD  Primary Care Physician:         Trista Orellana          Admission Diagnoses:   Nephrolithiasis [N20.0]  Xanthogranulomatous pyelonephritis [N11.8]          Discharge Diagnosis:   Same as above         Procedures:   Procedure(s):  Robotic RIGHT NEPHRECTOMY        Non-operative procedures:   None performed          Consultations:   None         Imaging Studies:     Results for orders placed or performed in visit on 05/20/22   Screening Mammogram Digital Bilateral    Narrative    BILATERAL FULL FIELD DIGITAL SCREENING MAMMOGRAM    Performed on: 5/20/22    Compared to: 05/06/2021, 09/27/2019, and 03/30/2018    Technique: This study was evaluated with the assistance of Computer-Aided   Detection.    Findings: The breasts have scattered areas of fibroglandular density.    There is no significant interval change.     IMPRESSION: ACR BI-RADS Category 1: Negative    RECOMMENDED FOLLOW-UP: Annual routine screening mammogram    The results and recommendations of this examination will be communicated   to the patient.    I have personally reviewed the examination and initial interpretation  and I agree with the findings.            Medications Prior to Admission:     No medications prior to admission.            Discharge Medications:     Discharge Medication List as of 8/9/2022 11:39 AM      START taking these medications    Details   oxyCODONE (ROXICODONE) 5 MG tablet Take 1 tablet (5 mg) by mouth every 6 hours as needed for pain, Disp-12 tablet, R-0, Local Print      senna-docusate (SENOKOT-S/PERICOLACE) 8.6-50 MG tablet Take 1 tablet by mouth 2 times daily, Disp-20 tablet, R-0, E-Prescribe         CONTINUE these medications which have  CHANGED    Details   ciprofloxacin (CIPRO) 500 MG tablet Take 1 tablet (500 mg) by mouth 2 times daily, Disp-28 tablet, R-0, E-Prescribe         CONTINUE these medications which have NOT CHANGED    Details   LORazepam (ATIVAN) 0.5 MG tablet Take 0.5 tablets (0.25 mg) by mouth every 6 hours as needed for anxiety, Disp-15 tablet, R-0, E-Prescribe      losartan (COZAAR) 50 MG tablet Take 1 tablet (50 mg) by mouth daily, Disp-90 tablet, R-0, E-Prescribe      metoprolol tartrate (LOPRESSOR) 100 MG tablet Take 100 mg by mouth 2 times daily, Historical      sulfamethoxazole-trimethoprim (BACTRIM DS) 800-160 MG tablet Take 1 tablet by mouth 2 times daily, Disp-20 tablet, R-0, E-Prescribe                    Brief History of Illness:   Reason for admission requiring a surgical or invasive procedure:   Nephrolithiasis [N20.0]   The patient underwent the following procedure(s):   See above   There were no immediate complications during this procedure.    Please refer to the full operative summary for details.           Hospital Course:   The patient's hospital course was unremarkable.  Afua Galvan recovered as anticipated and experienced no post-operative complications. ARLYN drain was removed prior to discharge. Catheter was removed as well and was voiding spontaneously.    On POD#1 patient was ambulating without assistance, tolerating a regular diet, had pain controlled with PO medications to go home with, and requiring no IV medications or fluids. Patient was discharged home with appropriate contact information, follow-up and instructions as seen below in the discharge paperwork.         Final Pathology Result:   Pending at time of discharge         Discharge Instructions and Follow-Up:     Discharge Procedure Orders   Activity   Order Comments: Your activity upon discharge: activity as tolerated and no heavy lifting for 6 weeks     Order Specific Question Answer Comments   Is discharge order? Yes      Reason for your  hospital stay   Order Comments: You were hospitalized following surgery to have your right kidney removed     Discharge Instructions   Order Comments: Activity  - No strenuous exercise for 6 weeks.  - No lifting, pushing, pulling more than 10 pounds for 6 weeks.   - Do not strain with bowel movements.  - Do not drive until you can press the brake pedal quickly and fully without pain.   - Do not operate a motor vehicle while taking narcotic pain medications.     Diet  - You may resume your regular post-procedure diet  - Many patients do not regain their full appetite for several weeks after surgery  - Take it slow, eating small meals frequently  - If you notice you are passing less gas or feeling bloated, stop eating solid foods and stick to liquids for the next several hours until you begin to pass gas again.  - You are not required to have a bowel movement prior to leaving the hospital. Some patients take several days for bowel movements to return due to anesthesia and pain medications.     Incisions  - You may shower and get incisions wet starting 48 hrs after surgery.  - Do not scrub incisions or submerge wounds for 4 weeks or until seen in follow-up.   - Remove wound dressing 48 hours after surgery.   - If purple dermabond glue was used, avoid applying any lotions or ointments.   - If steri-strips were used, they will fall off on their own.   - Leave incision open to air. Cover with gauze only if needed for comfort or to protect clothing from drainage.   - The stitches do not need to be removed, they will dissolve on their own.    Medications  - Transition from narcotic pain medications to tylenol (acetaminophen) as you are able.  Wean yourself off all pain medications as you are able.  - Do not take other medications that can sedate you or make you sleepy while taking oxycodone  - Some pain medications contain both tylenol (acetaminophen) and a narcotic (Norco, vicodin, percocet), do not take more than 4,000mg  "of Tylenol (acetaminophen) from all sources in any 24 hour period.  - Narcotics can make you constipated.  Take over the counter fiber (metamucil or benefiber) and stool softeners (miralax, docusate or senna) while taking narcotic pain medications, but stop if you develop diarrhea.  - No driving or operating machinery while taking narcotic pain medications  - Ciprofloxacin has been prescribed. Take this for the next 2 weeks until no pills remain, even if you are feeling better     Follow-Up:  - Follow up with Dr. Orellana as scheduled on 8/19/2022.  - Call or return sooner than your regularly scheduled visit if you develop any of the following: fever (greater than 101.5), uncontrolled pain, uncontrolled nausea or vomiting, or inability to urinate.    Phone numbers:   - Monday through Friday 8am to 4:30pm: Call [phone] with questions, requests for medication refills, or to schedule or confirm an appointment.  - Nights or weekends: call the after hours emergency pager - [phone] and tell the  \"I would like to page the Urology Resident on call.\" Please note, due to prescribing laws, resident physicians are unable to prescribe narcotics after-hours. If you feel as though you will need a refill of a narcotic pain medication, you will need to call the clinic during business hours OR seek emergency care.  - For emergencies, call [phone]     Diet   Order Comments: Follow this diet upon discharge: Orders Placed This Encounter      Regular Diet Adult     Order Specific Question Answer Comments   Is discharge order? Yes             Discharge Disposition:     Discharged to Home      Condition at discharge: Good    --    Asim De Jesus MD  Urology Resident    1:38 PM, 8/9/2022    "

## 2022-08-09 NOTE — PLAN OF CARE
Patient discharged home following hospital stay for: R nephrectomy  Vitals stable: Yes  Oxygen status: RA  Patient tolerating diet: Yes  Belongings sent with patient.  IV site discontinued  Discharge meds   AVS and education gone over with patient.   Follow ups: with primary doctor

## 2022-08-09 NOTE — PLAN OF CARE
Goal Outcome Evaluation:    Plan of Care Reviewed With: patient     Overall Patient Progress: improving    Time: 7776-0100  Status: s/p robotic right nephrectomy   VS: Vitals stable on 2L NC.   Neuros: A&Ox4, able to make needs known.   Cardiac: WNL, denies chest pain.   Respiratory: Sats 92-93% on 2L NC, on Capno monitoring. Denies SOB. IS encouraged.   GI/: Toro with AUOP. Hypo BS, -flatus, no BM.   Diet: Tolerating CLD, advanced to regular in am.   Pain: Abdomen and back pain managed with prn oxycodone, prn dilaudid and scheduled tylenol with relief.   Nausea: Denies N/V.   Skin: Lap sites x5 with surgical glue intact.   LDA: (R) PIV SL. (L) PIV infusing.   Drain: (R) ARLYN drain to bulb suction, dressing CDI.   Labs: Reviewed  Activity: Activity encouraged, not OOB overnight.   New changes this shift: No acute changes overnight.   Plan: Discontinue toro in am, wean O2 as tolerated. Continue POC.

## 2022-08-10 ENCOUNTER — PRE VISIT (OUTPATIENT)
Dept: UROLOGY | Facility: CLINIC | Age: 42
End: 2022-08-10

## 2022-08-10 ENCOUNTER — PATIENT OUTREACH (OUTPATIENT)
Dept: CARE COORDINATION | Facility: CLINIC | Age: 42
End: 2022-08-10

## 2022-08-10 DIAGNOSIS — Z71.89 OTHER SPECIFIED COUNSELING: ICD-10-CM

## 2022-08-10 NOTE — PROGRESS NOTES
Clinic Care Coordination Contact  [facility]/Voicemail       Clinical Data: Care Coordinator Outreach  Outreach attempted x 1.  Left message on patient's voicemail with call back information and requested return call.  Plan: Care Coordinator will try to reach patient again in 1-2 business days.        NASEEM Buenrostro  [phone]  [facility]

## 2022-08-10 NOTE — TELEPHONE ENCOUNTER
Reason for visit: follow up     Relevant information: post op, s/p nephrectomy     Records/imaging/labs/orders: in epic    Pt called: no    At Rooming: virtual

## 2022-08-11 ENCOUNTER — DOCUMENTATION ONLY (OUTPATIENT)
Dept: UROLOGY | Facility: CLINIC | Age: 42
End: 2022-08-11

## 2022-08-11 NOTE — PROGRESS NOTES
Clinic Care Coordination Contact  [facility]/Voicemail       Clinical Data: Care Coordinator Outreach    Outreach attempted x 2.  Left message on patient's voicemail with call back information and requested return call.    Plan:  Care Coordinator will do no further outreaches at this time.    Kassandra Loja  [facility] Care Resource [facility]

## 2022-08-11 NOTE — PROGRESS NOTES
Action August 11, 2022 JTV 8:17 AM    Action Taken CSS called patient. Patient did not . CSS unable to get VB OK and locate recent images.    Images were pushed and CSS resolved images. Dr Orellana was updated.

## 2022-08-12 ENCOUNTER — NURSE TRIAGE (OUTPATIENT)
Dept: NURSING | Facility: CLINIC | Age: 42
End: 2022-08-12

## 2022-08-12 ENCOUNTER — TELEPHONE (OUTPATIENT)
Dept: FAMILY MEDICINE | Facility: CLINIC | Age: 42
End: 2022-08-12

## 2022-08-12 DIAGNOSIS — R10.9 FLANK PAIN: Primary | ICD-10-CM

## 2022-08-12 DIAGNOSIS — N20.0 CALCULUS OF RIGHT KIDNEY: Primary | ICD-10-CM

## 2022-08-12 DIAGNOSIS — N20.0 CALCULUS, KIDNEY: ICD-10-CM

## 2022-08-12 LAB
BACTERIA ABSC ANAEROBE+AEROBE CULT: ABNORMAL
PATH REPORT.COMMENTS IMP SPEC: NORMAL
PATH REPORT.FINAL DX SPEC: NORMAL
PATH REPORT.GROSS SPEC: NORMAL
PATH REPORT.MICROSCOPIC SPEC OTHER STN: NORMAL
PATH REPORT.RELEVANT HX SPEC: NORMAL
PHOTO IMAGE: NORMAL

## 2022-08-12 RX ORDER — OXYCODONE HYDROCHLORIDE 5 MG/1
5 TABLET ORAL EVERY 6 HOURS PRN
Qty: 20 TABLET | Refills: 0 | Status: SHIPPED | OUTPATIENT
Start: 2022-08-12 | End: 2022-08-17

## 2022-08-12 RX ORDER — OXYCODONE HYDROCHLORIDE 5 MG/1
5-10 TABLET ORAL EVERY 6 HOURS PRN
Qty: 60 TABLET | Status: CANCELLED
Start: 2022-08-12 | End: 2022-08-15

## 2022-08-12 NOTE — TELEPHONE ENCOUNTER
Spoke to pt. Pt is taking Tylenol 4000 mg a day and oxycodone for pain relief. Oxycodone every 6 hours, 10 mg. Only had 12 tablets. Completely out of it now. Last BM today and normal. Able to eat and drink ok, but appetite is low. Pt is able to participate in normal activities. Surgical incisions are healing well.     Message sent to Dr. Orellana for review.     Anali Lisa RN MSN

## 2022-08-12 NOTE — TELEPHONE ENCOUNTER
Spoke to pt. Informed pt that Dr. Orellana will send orders for pt. Pt verbalized understanding and gratitude.     Anali Lisa RN MSN

## 2022-08-12 NOTE — TELEPHONE ENCOUNTER
Patient recently had surgery and only had 3 days of pain meds. She states she is still in a ton of pain. She has been trying to reach out to her surgeons office to see if they could fill more. Patient has not heard back from them. She called her PCP's office to see in they would be willing to refill. I said I would send a message but that the provider is not in clinic on Fridays so I couldn't promise she would see it.     Nneka Temple, CMA

## 2022-08-12 NOTE — TELEPHONE ENCOUNTER
Nurse Triage SBAR    Is this a 2nd Level Triage?  Yes    Situation:    Requesting pain medication      Background/Assessment:     Pt just had a kidney removed and is out of pain medication.  Pt requesting a new order for the weekend.  Pt wondering if she can take 2 tabs instead of one tab because the pain is so bad.    [facility].    Please call Pt back @ [phone] for further assistance.    Medication has been pended.    Hilary Contreras RN  Central Triage Red Flags/Med Refills    Protocol Recommended Disposition:   Discuss With PCP And Callback By Nurse Today      Reason for Disposition    Caller requesting a CONTROLLED substance prescription refill (e.g., narcotics, ADHD medicines)    Additional Information    Negative: New-onset or worsening symptoms, see that protocol (e.g., diarrhea, runny nose, sore throat)    Negative: Medicine question not related to refill or renewal    Negative: Caller (e.g., patient or pharmacist) requesting information about a new medicine    Negative: Caller requesting information unrelated to medicine    Negative: Prescription refill request for ESSENTIAL medicine (i.e., likelihood of harm to patient if not taken) and triager unable to refill per department policy    Negative: Prescription not at pharmacy and was prescribed by PCP recently  (Exception: triager has access to EMR and prescription is recorded there. Go to Home Care and confirm for pharmacy.)    Negative: Pharmacy calling with prescription questions and triager unable to answer question    Protocols used: MEDICATION REFILL AND RENEWAL CALL-A-[place]

## 2022-08-13 NOTE — RESULT ENCOUNTER NOTE
Ms. Mandy,   The culture from you kidney shows that the infection from your kidney should be well treated by the 2 weeks of cipro that you are taking.  I hope your recovery is going well. Thank you, Isidro Orellana.

## 2022-08-15 ENCOUNTER — TELEPHONE (OUTPATIENT)
Dept: UROLOGY | Facility: CLINIC | Age: 42
End: 2022-08-15

## 2022-08-15 ENCOUNTER — MYC MEDICAL ADVICE (OUTPATIENT)
Dept: UROLOGY | Facility: CLINIC | Age: 42
End: 2022-08-15

## 2022-08-15 ENCOUNTER — TELEPHONE (OUTPATIENT)
Dept: FAMILY MEDICINE | Facility: CLINIC | Age: 42
End: 2022-08-15

## 2022-08-15 NOTE — TELEPHONE ENCOUNTER
[facility] Call Center    Phone Message    May a detailed message be left on voicemail: yes     Reason for Call: Patient has some questions for Anali Lisa regarding her care, please have Anali reach out to patient to discuss.  Thank you.    Action Taken: Other: Urology    Travel Screening: Not Applicable

## 2022-08-15 NOTE — TELEPHONE ENCOUNTER
Afua called to discuss her surgery and subsequent pain management issues.  She had a nephrectomy on August 8th at 7:30 pm and was discharged from the hospital the next day in the afternoon.  She was discharged with 3 days of pain medication, received more due to severe pain 3 days later and now continues to have pain but is better.  There was no pain management program or information for her at discharge, she is not sure what she CAN take (NSAIDS or not?) or what the plan is.  She has good at home care, she has no history of substance abuse concerns, she just feels that she does not have a clear plan.    She has a virtual follow up visit that was scheduled on Friday, 8/19/22 after she received her second round of analgesics.    I recommended that Afua update her surgeon and get all her questions answered.  I explained why it is not appropriate at this juncture for PC to give narcotic pain medication, in case there are complications of surgery or other concerns that would be masked by medication.    Afua understood and was very willing to call the surgeon to get a concrete plan for pain management.  She will follow up with me when she is able to connect with them.

## 2022-08-16 ENCOUNTER — PATIENT OUTREACH (OUTPATIENT)
Dept: UROLOGY | Facility: CLINIC | Age: 42
End: 2022-08-16

## 2022-08-16 ENCOUNTER — TELEPHONE (OUTPATIENT)
Dept: UROLOGY | Facility: CLINIC | Age: 42
End: 2022-08-16

## 2022-08-16 DIAGNOSIS — N20.0 KIDNEY STONE: Primary | ICD-10-CM

## 2022-08-16 DIAGNOSIS — R10.9 FLANK PAIN: ICD-10-CM

## 2022-08-16 DIAGNOSIS — Z90.5 S/P NEPHRECTOMY: Primary | ICD-10-CM

## 2022-08-16 RX ORDER — OXYCODONE HYDROCHLORIDE 5 MG/1
5 TABLET ORAL EVERY 6 HOURS PRN
Qty: 35 TABLET | Refills: 0 | Status: SHIPPED | OUTPATIENT
Start: 2022-08-16 | End: 2022-08-23

## 2022-08-16 NOTE — TELEPHONE ENCOUNTER
Updated Dr Orellana about this patient request for pain medication and her pain   He wants as follows:  CT with contrast  In person appointment on Friday   Narcotics will be sent     Vicky Vazquez RN, BSN  Care Coordinator Urology

## 2022-08-16 NOTE — TELEPHONE ENCOUNTER
Patient requesting pain medication since she is out from Friday. (20)  Patient rates her pain 7/10 more with movement. Discussed not taking ibuprofen but tylenol is okay. Patient says she feels better than last week Tuesday but still has significant pain. Patient is scheduled to see Dr Orellana Friday virtually.  Answered questions about recover.     Vicky Vazquez RN, BSN  Care Coordinator Urology

## 2022-08-16 NOTE — TELEPHONE ENCOUNTER
Pt is already scheduled but for video. Writer changing format to in person.    Writer got CT scheduled for 9:20am before Friday appt with Dr Orellana.

## 2022-08-16 NOTE — TELEPHONE ENCOUNTER
----- Message from Vicky Vazquez RN sent at 8/16/2022 12:11 PM CDT -----  Cata,  Would you schedule this patient to come in person on Friday to see Esteban with Ct prior to the visit?  I have placed the orders and updated the patient  Thanks in advance   Vicky

## 2022-08-16 NOTE — PROGRESS NOTES
Spoke to pt. Pt reports that her pain is improving overall and that she is able to move around more with more energy. She is able to eat and drink ok. Able to void and have BM ok. Pt ran out of oxycodone on Sunday night and has been using borrowed hydrocodone since. She has been watching to ensure she is not exceeding 4 grams of acetaminophen. Currently pain is rated 5-6/10. She states that this is tolerable for her. Discussed that she should stop taking hydrocodone and only take oxycodone when her pain rating is above 8/10 or severe. Advised that she should schedule out her tylenol around the clock to help stabilize her pain. Discussed that she will have to tolerate some amount of pain, but should improve over time. Her follow up with Dr. Orellana, she can discuss how long this recovery is expected to take. Pt verbalized understanding and feeling of reassurance. No other questions noted.     Anali Lisa RN MSN

## 2022-08-19 ENCOUNTER — MYC MEDICAL ADVICE (OUTPATIENT)
Dept: UROLOGY | Facility: CLINIC | Age: 42
End: 2022-08-19

## 2022-08-19 ENCOUNTER — VIRTUAL VISIT (OUTPATIENT)
Dept: UROLOGY | Facility: CLINIC | Age: 42
End: 2022-08-19
Payer: COMMERCIAL

## 2022-08-19 DIAGNOSIS — N11.8 XANTHOGRANULOMATOUS PYELONEPHRITIS: Primary | ICD-10-CM

## 2022-08-19 PROCEDURE — 99024 POSTOP FOLLOW-UP VISIT: CPT | Performed by: UROLOGY

## 2022-08-19 NOTE — PROGRESS NOTES
"Afua is a 42 year old who is being evaluated via a billable video visit.      How would you like to obtain your AVS? MyChart  If the video visit is dropped, the invitation should be resent by: Text to cell phone: [phone]  Will anyone else be joining your video visit? No  {If patient encounters technical issues they should call [phone] :[id number]}      Video-Visit Details    Video Start Time: {video visit start/end time for provider to select:927973}    Type of service:  Video Visit    Video End Time:{video visit start/end time for provider to select:089471}    Originating Location (pt. Location): {video visit patient location:604353::\"Home\"}    Distant Location (provider location):  [facility] [place]     Platform used for Video Visit: {Virtual Visit Platforms:921159::\"Bio[employer]\"}  "

## 2022-08-19 NOTE — PROGRESS NOTES
Urology Clinic  SALONI Orellana MD    Aug 19, 2022  42 year old female      Assessment and Plan  Right kidney stones     8/10/15 Right ureteral stent with Dr Garcia      Right non functional kidney    4/21/22 LASIX RENOGRAM 3%    8/8/22 right nephrectomy.  Pathology XGP kidney      Urinary tract infection symptoms         The following are complicating factors.  These increase the risk of perioperative complications and make treatment more complicated.    Morbid obesity  BMI 39    Standard recommendations on kidney stone prevention were provided.  These include to maintain fluid intake of 3 liters per day or more with a goal of making 2 or more liters of urine per day.  If alcoholic or caffeinated beverages are consumed then she needs to drink water along with these beverages to maintain hydration.  A few ounces of lemon juice concentrate a day diluted in water can help prevent stones.  She should limit her intake of red meat, salt, and salty processed foods.  She should maintain calcium intake in her diet through continued consumption of dairy products etc.  She should limit foods that are high in oxalate such as spinach, sweet potatoe, dark chocolate, soy products, and some nuts such as peanuts.    Plan    Follow-up with primary care physician for ovarian cyst.    Stone preventative measures    We discussed follow-up CT to confirm resolution of psoas abscess, but given her total lack of symptoms we will instead watch for symptoms (which I covered today)    Follow-up as needed.      __________________________________________________    HPI  She had kidney stones in 2015.  The treatment for this was really traumatic and she had a urinary tract infection.    Recently her father passed away and she began having right flank pain and a fever.    She is having dysuria      4/21/22 Lasix Renogram   I reviewed the radiologic images and report from this radiologic exam.  My independent interpretation is:    Non  functional right kidney    Radiologist Impression  1. Normal functioning left kidney with normal perfusion.  2. Right kidney is nonfunctioning and does not show perfusion or  excretion.      4/13/22 CT Abdomen/Pelvis without contrast   I reviewed the radiologic images and report from this radiologic exam.  My independent interpretation is:    Multiple large right kidney stones.    Radiologist Impression  1. Multiple large obstructing renal calculi measuring up to 2.3 cm, in  the inferior and interpolar collecting system, source of the right  ureteropelvic junction, with severe hydronephrosis and cortical  atrophy, with perinephric fat stranding. Obstructing stones in the  setting of chronic xanthogranulomatous pyelonephritis.  2. Reactive retroperitoneal lymphadenopathy and inflammatory change.  3. Large pelvic/ovarian cysts suspected. When viewed on the left may  represent a hemorrhagic cyst with some layering internal density. No  adjacent fluid or stranding to suggest ruptured ovarian cyst. Could  consider elective follow-up ultrasound pelvis to better evaluate.    I reviewed the following labs  I reviewed the following laboratory data and went over findings with patient:  Recent Labs   Lab Test 10/13/15  1420 08/05/15  1108   WBC 6.9 9.9   HGB 11.7 8.7*    464*     Recent Labs   Lab Test 09/20/17  1009 10/13/15  1420 08/05/15  1108   CR 0.80 0.70 0.82   GFRESTIMATED 80 >90  Non  GFR Calc   78   GFRESTBLACK >90 >90   GFR Calc   >90   GFR Calc     * 102* 100*     Video-Visit Details  Video Start Time: 141  Video End Time: 206  Time spent on pre-visit work, post visit work, and documentation on day of visit outside of time during video visit: 2 min  Total time on the day of the visit: 27 min  Originating Location (pt. Location): Home.    Distant Location (provider location):  [facility].  Platform used for Video Visit:  Roberta            CC  Patient Care Team:  Trista Orellana NP as PCP - General (Family Medicine)  Radha Garcia MD as MD (Urology)  Self, Referred, MD as Referring Physician  Radha Garcia MD as MD (Urology)  Rochelle Younger CNP as Nurse Practitioner (Urology)  Vahe Orellana MD as Assigned Surgical Provider  Trista Orellana NP as Assigned PCP      Copy to patient  FRANTZ MARSH  [address]  [place] [address]

## 2022-08-19 NOTE — LETTER
8/19/2022       RE: Afua Galvan  [address]  [facility] [address]     Dear Colleague,    Thank you for referring your patient, Afua Galvan, to the [facility] [place] at [facility]. Please see a copy of my visit note below.      Urology Clinic  SALONI Orellana MD    Aug 19, 2022  42 year old female      Assessment and Plan  Right kidney stones     8/10/15 Right ureteral stent with Dr Garcia      Right non functional kidney    4/21/22 LASIX RENOGRAM 3%    8/8/22 right nephrectomy.  Pathology XGP kidney      Urinary tract infection symptoms         The following are complicating factors.  These increase the risk of perioperative complications and make treatment more complicated.    Morbid obesity  BMI 39    Standard recommendations on kidney stone prevention were provided.  These include to maintain fluid intake of 3 liters per day or more with a goal of making 2 or more liters of urine per day.  If alcoholic or caffeinated beverages are consumed then she needs to drink water along with these beverages to maintain hydration.  A few ounces of lemon juice concentrate a day diluted in water can help prevent stones.  She should limit her intake of red meat, salt, and salty processed foods.  She should maintain calcium intake in her diet through continued consumption of dairy products etc.  She should limit foods that are high in oxalate such as spinach, sweet potatoe, dark chocolate, soy products, and some nuts such as peanuts.    Plan    Follow-up with primary care physician for ovarian cyst.    Stone preventative measures    We discussed follow-up CT to confirm resolution of psoas abscess, but given her total lack of symptoms we will instead watch for symptoms (which I covered today)    Follow-up as needed.      __________________________________________________    HPI  She had kidney stones in 2015.  The treatment for this was  really traumatic and she had a urinary tract infection.    Recently her father passed away and she began having right flank pain and a fever.    She is having dysuria      4/21/22 Lasix Renogram   I reviewed the radiologic images and report from this radiologic exam.  My independent interpretation is:    Non functional right kidney    Radiologist Impression  1. Normal functioning left kidney with normal perfusion.  2. Right kidney is nonfunctioning and does not show perfusion or  excretion.      4/13/22 CT Abdomen/Pelvis without contrast   I reviewed the radiologic images and report from this radiologic exam.  My independent interpretation is:    Multiple large right kidney stones.    Radiologist Impression  1. Multiple large obstructing renal calculi measuring up to 2.3 cm, in  the inferior and interpolar collecting system, source of the right  ureteropelvic junction, with severe hydronephrosis and cortical  atrophy, with perinephric fat stranding. Obstructing stones in the  setting of chronic xanthogranulomatous pyelonephritis.  2. Reactive retroperitoneal lymphadenopathy and inflammatory change.  3. Large pelvic/ovarian cysts suspected. When viewed on the left may  represent a hemorrhagic cyst with some layering internal density. No  adjacent fluid or stranding to suggest ruptured ovarian cyst. Could  consider elective follow-up ultrasound pelvis to better evaluate.    I reviewed the following labs  I reviewed the following laboratory data and went over findings with patient:  Recent Labs   Lab Test 10/13/15  1420 08/05/15  1108   WBC 6.9 9.9   HGB 11.7 8.7*    464*     Recent Labs   Lab Test 09/20/17  1009 10/13/15  1420 08/05/15  1108   CR 0.80 0.70 0.82   GFRESTIMATED 80 >90  Non  GFR Calc   78   GFRESTBLACK >90 >90   GFR Calc   >90   GFR Calc     * 102* 100*     Video-Visit Details  Video Start Time: 141  Video End Time: 206  Time spent on  pre-visit work, post visit work, and documentation on day of visit outside of time during video visit: 2 min  Total time on the day of the visit: 27 min  Originating Location (pt. Location): Home.    Distant Location (provider location):  [facility].  Platform used for Video Visit: [employer]  Patient Care Team:  Trista Orellana NP as PCP - General (Family Medicine)  Radha Garcia MD as MD (Urology)  Self, Referred, MD as Referring Physician  Radha Garcia MD as MD (Urology)  Rochelle Younger CNP as Nurse Practitioner (Urology)  Vahe Orellana MD as Assigned Surgical Provider  Trista Orellana NP as Assigned PCP      Copy to patient  FRANTZ MARSH  [address] [place] [place] [address]    Vahe Orellana MD

## 2022-08-24 NOTE — PATIENT INSTRUCTIONS
Please follow up as needed.    It was a pleasure meeting with you today.  Thank you for allowing me and my team the privilege of caring for you today.  YOU are the reason we are here, and I truly hope we provided you with the excellent service you deserve.  Please let us know if there is anything else we can do for you so that we can be sure you are leaving completely satisfied with your care experience.

## 2022-10-09 ENCOUNTER — HEALTH MAINTENANCE LETTER (OUTPATIENT)
Age: 42
End: 2022-10-09

## 2022-10-21 DIAGNOSIS — I10 BENIGN ESSENTIAL HYPERTENSION: ICD-10-CM

## 2022-10-25 ENCOUNTER — MYC REFILL (OUTPATIENT)
Dept: FAMILY MEDICINE | Facility: CLINIC | Age: 42
End: 2022-10-25

## 2022-10-25 DIAGNOSIS — I10 HYPERTENSION, UNSPECIFIED TYPE: Primary | ICD-10-CM

## 2022-10-25 RX ORDER — LOSARTAN POTASSIUM 50 MG/1
50 TABLET ORAL DAILY
Qty: 90 TABLET | Refills: 2 | Status: SHIPPED | OUTPATIENT
Start: 2022-10-25 | End: 2023-08-04

## 2022-10-26 NOTE — TELEPHONE ENCOUNTER
metoprolol tartrate (LOPRESSOR) 100 MG tablet   Last Written Prescription Date:  Reported/ historical  Last Fill Quantity: 360,   # refills: 0- discontinued- med rec/ clean up  Last Office Visit : 7/29/22  Future Office visit:  none    Routing refill request to provider for review/approval because:  Medication is reported/ historical

## 2022-10-27 RX ORDER — METOPROLOL TARTRATE 100 MG
100 TABLET ORAL 2 TIMES DAILY
Qty: 180 TABLET | Refills: 1 | Status: SHIPPED | OUTPATIENT
Start: 2022-10-27 | End: 2023-04-24

## 2022-11-26 ENCOUNTER — HEALTH MAINTENANCE LETTER (OUTPATIENT)
Age: 42
End: 2022-11-26

## 2023-04-24 DIAGNOSIS — I10 HYPERTENSION, UNSPECIFIED TYPE: ICD-10-CM

## 2023-04-24 RX ORDER — METOPROLOL TARTRATE 100 MG
TABLET ORAL
Qty: 180 TABLET | Refills: 1 | OUTPATIENT
Start: 2023-04-24

## 2023-04-24 RX ORDER — METOPROLOL TARTRATE 100 MG
100 TABLET ORAL 2 TIMES DAILY
Qty: 180 TABLET | Refills: 1 | Status: SHIPPED | OUTPATIENT
Start: 2023-04-24 | End: 2023-11-01

## 2023-04-26 DIAGNOSIS — I10 BENIGN ESSENTIAL HYPERTENSION: ICD-10-CM

## 2023-04-30 RX ORDER — LOSARTAN POTASSIUM 50 MG/1
TABLET ORAL
Qty: 90 TABLET | Refills: 2 | OUTPATIENT
Start: 2023-04-30

## 2023-08-03 DIAGNOSIS — I10 BENIGN ESSENTIAL HYPERTENSION: ICD-10-CM

## 2023-08-04 ENCOUNTER — TELEPHONE (OUTPATIENT)
Dept: FAMILY MEDICINE | Facility: CLINIC | Age: 43
End: 2023-08-04

## 2023-08-04 DIAGNOSIS — I10 BENIGN ESSENTIAL HYPERTENSION: ICD-10-CM

## 2023-08-04 RX ORDER — LOSARTAN POTASSIUM 50 MG/1
50 TABLET ORAL DAILY
Qty: 30 TABLET | Refills: 1 | Status: SHIPPED | OUTPATIENT
Start: 2023-08-04 | End: 2023-08-18

## 2023-08-04 NOTE — TELEPHONE ENCOUNTER
[facility] phone call message - patient requesting a refill:    Full Medication Name:    losartan (COZAAR) 50 MG tablet         Dose: 50 mg     Pharmacy confirmed as     [id number] - [place], [place] - [address] AT [facility] OF [address]  [address]  [place] [address]  Phone: [phone] Fax: [phone]  : Yes    Medication tab checked to see if medication has been sent  Yes    Is patient out of medication: Yes    Did patient contact the pharmacy? Yes    Additional Comments:      Primary language: English      needed? No    Call taken on August 4, 2023 at 11:04 AM by Hakeem Rowley    Route to P [facility] MED REFILLS TEAM     ++If medication is a controlled substance, please route directly to the provider++

## 2023-08-04 NOTE — TELEPHONE ENCOUNTER
Called talked with patient, they said they will call back sometime when they come back for their labs.

## 2023-08-04 NOTE — TELEPHONE ENCOUNTER
This is Trista's patient can anyone refill this medication, patient is leaving out of town this weekend.

## 2023-08-06 RX ORDER — LOSARTAN POTASSIUM 50 MG/1
50 TABLET ORAL DAILY
Qty: 90 TABLET | Refills: 2 | OUTPATIENT
Start: 2023-08-06

## 2023-08-08 ENCOUNTER — ANCILLARY PROCEDURE (OUTPATIENT)
Dept: MAMMOGRAPHY | Facility: CLINIC | Age: 43
End: 2023-08-08
Attending: NURSE PRACTITIONER
Payer: COMMERCIAL

## 2023-08-08 DIAGNOSIS — Z12.31 VISIT FOR SCREENING MAMMOGRAM: ICD-10-CM

## 2023-08-08 PROCEDURE — 77063 BREAST TOMOSYNTHESIS BI: CPT | Mod: GC | Performed by: STUDENT IN AN ORGANIZED HEALTH CARE EDUCATION/TRAINING PROGRAM

## 2023-08-08 PROCEDURE — 77067 SCR MAMMO BI INCL CAD: CPT | Mod: GC | Performed by: STUDENT IN AN ORGANIZED HEALTH CARE EDUCATION/TRAINING PROGRAM

## 2023-08-17 ENCOUNTER — LAB (OUTPATIENT)
Dept: LAB | Facility: CLINIC | Age: 43
End: 2023-08-17
Payer: COMMERCIAL

## 2023-08-17 DIAGNOSIS — I10 BENIGN ESSENTIAL HYPERTENSION: ICD-10-CM

## 2023-08-17 LAB
ANION GAP SERPL CALCULATED.3IONS-SCNC: 11 MMOL/L (ref 7–15)
BUN SERPL-MCNC: 15.2 MG/DL (ref 6–20)
CALCIUM SERPL-MCNC: 9.4 MG/DL (ref 8.6–10)
CHLORIDE SERPL-SCNC: 106 MMOL/L (ref 98–107)
CREAT SERPL-MCNC: 0.81 MG/DL (ref 0.51–0.95)
DEPRECATED HCO3 PLAS-SCNC: 22 MMOL/L (ref 22–29)
GFR SERPL CREATININE-BSD FRML MDRD: >90 ML/MIN/1.73M2
GLUCOSE SERPL-MCNC: 117 MG/DL (ref 70–99)
POTASSIUM SERPL-SCNC: 4.3 MMOL/L (ref 3.4–5.3)
SODIUM SERPL-SCNC: 139 MMOL/L (ref 136–145)

## 2023-08-17 PROCEDURE — 36415 COLL VENOUS BLD VENIPUNCTURE: CPT

## 2023-08-18 DIAGNOSIS — I10 BENIGN ESSENTIAL HYPERTENSION: ICD-10-CM

## 2023-08-18 RX ORDER — LOSARTAN POTASSIUM 50 MG/1
50 TABLET ORAL DAILY
Qty: 90 TABLET | Refills: 1 | Status: SHIPPED | OUTPATIENT
Start: 2023-08-18 | End: 2023-12-21

## 2023-10-28 DIAGNOSIS — I10 HYPERTENSION, UNSPECIFIED TYPE: ICD-10-CM

## 2023-10-30 RX ORDER — METOPROLOL TARTRATE 100 MG
100 TABLET ORAL 2 TIMES DAILY
Start: 2023-10-30

## 2023-10-31 DIAGNOSIS — I10 HYPERTENSION, UNSPECIFIED TYPE: ICD-10-CM

## 2023-11-01 DIAGNOSIS — I10 BENIGN ESSENTIAL HYPERTENSION: ICD-10-CM

## 2023-11-01 RX ORDER — METOPROLOL TARTRATE 100 MG
100 TABLET ORAL 2 TIMES DAILY
Qty: 180 TABLET | Refills: 1 | OUTPATIENT
Start: 2023-11-01

## 2023-11-01 RX ORDER — METOPROLOL TARTRATE 100 MG
100 TABLET ORAL 2 TIMES DAILY
Qty: 180 TABLET | OUTPATIENT
Start: 2023-11-01

## 2023-11-01 RX ORDER — METOPROLOL TARTRATE 100 MG
100 TABLET ORAL 2 TIMES DAILY
Qty: 60 TABLET | Refills: 0 | Status: SHIPPED | OUTPATIENT
Start: 2023-11-01 | End: 2023-11-24

## 2023-11-22 ENCOUNTER — TELEPHONE (OUTPATIENT)
Dept: FAMILY MEDICINE | Facility: CLINIC | Age: 43
End: 2023-11-22

## 2023-11-22 NOTE — TELEPHONE ENCOUNTER
Patient is calling to have medication metoprolol tartrate (LOPRESSOR) 100 MG tablet sent in before physical appointment with provider as she has 6-7 days left, and she is currently out of state in [place], [place] but will be back before appt, does not have enough medication before appt.     Pharmacy:  [facility] #[id number] - [place], [place] - [address] AT [facility] [address]  [address] [facility]  [place] [address]  Phone: [phone] Fax: [phone]

## 2023-11-24 DIAGNOSIS — I10 BENIGN ESSENTIAL HYPERTENSION: ICD-10-CM

## 2023-11-24 RX ORDER — METOPROLOL TARTRATE 100 MG
100 TABLET ORAL 2 TIMES DAILY
Qty: 60 TABLET | Refills: 0 | Status: SHIPPED | OUTPATIENT
Start: 2023-11-24 | End: 2023-12-21

## 2023-11-27 RX ORDER — METOPROLOL TARTRATE 100 MG
100 TABLET ORAL 2 TIMES DAILY
Qty: 180 TABLET | OUTPATIENT
Start: 2023-11-27

## 2023-12-21 DIAGNOSIS — I10 BENIGN ESSENTIAL HYPERTENSION: ICD-10-CM

## 2023-12-21 RX ORDER — METOPROLOL TARTRATE 100 MG
100 TABLET ORAL 2 TIMES DAILY
Qty: 60 TABLET | Refills: 0 | Status: SHIPPED | OUTPATIENT
Start: 2023-12-21 | End: 2024-01-26

## 2023-12-21 RX ORDER — METOPROLOL TARTRATE 100 MG
100 TABLET ORAL 2 TIMES DAILY
Qty: 60 TABLET | Refills: 0 | Status: SHIPPED | OUTPATIENT
Start: 2023-12-21 | End: 2023-12-21

## 2023-12-21 RX ORDER — LOSARTAN POTASSIUM 50 MG/1
50 TABLET ORAL DAILY
Qty: 30 TABLET | Refills: 0 | Status: SHIPPED | OUTPATIENT
Start: 2023-12-21 | End: 2024-01-26

## 2023-12-21 NOTE — TELEPHONE ENCOUNTER
metoprolol tartrate (LOPRESSOR) 100 MG tablet   Sent a new order to cover Pt until visit the very beginning of January 2024.    Hilary Contreras RN  Central Triage Red Flags/Med Refills

## 2023-12-21 NOTE — TELEPHONE ENCOUNTER
Called patient to reschedule from Dec 27th to Jan 3rd. She said that is fine but will be out of her medications and would like refills to be sent since the appointment day was changed. Writer said they will speak to Trista in regards to this.    [facility] phone call message - patient requesting a refill:    Full Medication Name: metoprolol tartrate (LOPRESSOR)      Dose: 100 MG tablet        Pharmacy confirmed as : [id number] - [facility] [place] - [address] AT [facility] OF  [address]    Medication tab checked to see if medication has been sent  Yes    Is patient out of medication: No.  Few days left    Did patient contact the pharmacy? No:     Additional Comments:      Primary language: English      needed? No    Call taken on December 21, 2023 at 11:07 AM by Ginny Mcelroy    Route to P [facility] MED REFILLS TEAM     ++If medication is a controlled substance, please route directly to the provider++    Ginny SOUZA CMA 11:09 AM 12/21/2023

## 2023-12-27 RX ORDER — METOPROLOL TARTRATE 100 MG
100 TABLET ORAL 2 TIMES DAILY
Qty: 180 TABLET | OUTPATIENT
Start: 2023-12-27

## 2023-12-27 ASSESSMENT — ENCOUNTER SYMPTOMS
HEADACHES: 0
FREQUENCY: 0
PARESTHESIAS: 0
ARTHRALGIAS: 0
CHILLS: 0
DIARRHEA: 0
MYALGIAS: 0
BREAST MASS: 0
SORE THROAT: 0
CONSTIPATION: 0
NAUSEA: 0
WEAKNESS: 0
SHORTNESS OF BREATH: 0
DIZZINESS: 0
DYSURIA: 0
COUGH: 0
NERVOUS/ANXIOUS: 1
ABDOMINAL PAIN: 0
HEMATOCHEZIA: 0
HEMATURIA: 0
EYE PAIN: 0
FEVER: 0
PALPITATIONS: 0
HEARTBURN: 0
JOINT SWELLING: 0

## 2024-01-03 ENCOUNTER — OFFICE VISIT (OUTPATIENT)
Dept: FAMILY MEDICINE | Facility: CLINIC | Age: 44
End: 2024-01-03
Payer: COMMERCIAL

## 2024-01-03 ENCOUNTER — MYC REFILL (OUTPATIENT)
Dept: FAMILY MEDICINE | Facility: CLINIC | Age: 44
End: 2024-01-03

## 2024-01-03 VITALS
BODY MASS INDEX: 38.37 KG/M2 | SYSTOLIC BLOOD PRESSURE: 138 MMHG | WEIGHT: 230.3 LBS | HEART RATE: 74 BPM | TEMPERATURE: 97.8 F | OXYGEN SATURATION: 99 % | DIASTOLIC BLOOD PRESSURE: 91 MMHG | HEIGHT: 65 IN

## 2024-01-03 DIAGNOSIS — Z00.00 ROUTINE HISTORY AND PHYSICAL EXAMINATION OF ADULT: Primary | ICD-10-CM

## 2024-01-03 DIAGNOSIS — D22.9 NORMAL MOLE OF SKIN: ICD-10-CM

## 2024-01-03 DIAGNOSIS — F41.9 ANXIETY: ICD-10-CM

## 2024-01-03 LAB
BASOPHILS # BLD AUTO: 0 10E3/UL (ref 0–0.2)
BASOPHILS NFR BLD AUTO: 0 %
EOSINOPHIL # BLD AUTO: 0.3 10E3/UL (ref 0–0.7)
EOSINOPHIL NFR BLD AUTO: 5 %
ERYTHROCYTE [DISTWIDTH] IN BLOOD BY AUTOMATED COUNT: 12.9 % (ref 10–15)
HBA1C MFR BLD: 6.3 %
HCT VFR BLD AUTO: 40 % (ref 35–47)
HGB BLD-MCNC: 12.9 G/DL (ref 11.7–15.7)
IMM GRANULOCYTES # BLD: 0 10E3/UL
IMM GRANULOCYTES NFR BLD: 0 %
LYMPHOCYTES # BLD AUTO: 2.4 10E3/UL (ref 0.8–5.3)
LYMPHOCYTES NFR BLD AUTO: 38 %
MCH RBC QN AUTO: 28.3 PG (ref 26.5–33)
MCHC RBC AUTO-ENTMCNC: 32.3 G/DL (ref 31.5–36.5)
MCV RBC AUTO: 88 FL (ref 78–100)
MONOCYTES # BLD AUTO: 0.4 10E3/UL (ref 0–1.3)
MONOCYTES NFR BLD AUTO: 7 %
NEUTROPHILS # BLD AUTO: 3.1 10E3/UL (ref 1.6–8.3)
NEUTROPHILS NFR BLD AUTO: 50 %
NRBC # BLD AUTO: 0 10E3/UL
NRBC BLD AUTO-RTO: 0 /100
PLATELET # BLD AUTO: 173 10E3/UL (ref 150–450)
RBC # BLD AUTO: 4.56 10E6/UL (ref 3.8–5.2)
WBC # BLD AUTO: 6.2 10E3/UL (ref 4–11)

## 2024-01-03 PROCEDURE — 85025 COMPLETE CBC W/AUTO DIFF WBC: CPT | Mod: ORL | Performed by: NURSE PRACTITIONER

## 2024-01-03 PROCEDURE — 80061 LIPID PANEL: CPT | Mod: ORL | Performed by: NURSE PRACTITIONER

## 2024-01-03 PROCEDURE — 80053 COMPREHEN METABOLIC PANEL: CPT | Mod: ORL | Performed by: NURSE PRACTITIONER

## 2024-01-03 PROCEDURE — 84443 ASSAY THYROID STIM HORMONE: CPT | Mod: ORL | Performed by: NURSE PRACTITIONER

## 2024-01-03 PROCEDURE — 83036 HEMOGLOBIN GLYCOSYLATED A1C: CPT | Mod: ORL | Performed by: NURSE PRACTITIONER

## 2024-01-03 RX ORDER — LORAZEPAM 0.5 MG/1
0.25 TABLET ORAL EVERY 6 HOURS PRN
Qty: 15 TABLET | Refills: 0 | Status: SHIPPED | OUTPATIENT
Start: 2024-01-03 | End: 2024-06-12

## 2024-01-03 ASSESSMENT — ENCOUNTER SYMPTOMS
FEVER: 0
DYSURIA: 0
HEARTBURN: 0
WEAKNESS: 0
NAUSEA: 0
FREQUENCY: 0
EYE PAIN: 0
SHORTNESS OF BREATH: 0
CHILLS: 0
COUGH: 0
ABDOMINAL PAIN: 0
HEADACHES: 0
PARESTHESIAS: 0
BREAST MASS: 0
SORE THROAT: 0
JOINT SWELLING: 0
PALPITATIONS: 0
CONSTIPATION: 0
HEMATOCHEZIA: 0
DIARRHEA: 0
NERVOUS/ANXIOUS: 1
HEMATURIA: 0
DIZZINESS: 0
ARTHRALGIAS: 0
MYALGIAS: 0

## 2024-01-03 ASSESSMENT — ANXIETY QUESTIONNAIRES
5. BEING SO RESTLESS THAT IT IS HARD TO SIT STILL: NOT AT ALL
IF YOU CHECKED OFF ANY PROBLEMS ON THIS QUESTIONNAIRE, HOW DIFFICULT HAVE THESE PROBLEMS MADE IT FOR YOU TO DO YOUR WORK, TAKE CARE OF THINGS AT HOME, OR GET ALONG WITH OTHER PEOPLE: SOMEWHAT DIFFICULT
6. BECOMING EASILY ANNOYED OR IRRITABLE: NOT AT ALL
1. FEELING NERVOUS, ANXIOUS, OR ON EDGE: SEVERAL DAYS
2. NOT BEING ABLE TO STOP OR CONTROL WORRYING: NOT AT ALL
GAD7 TOTAL SCORE: 1
3. WORRYING TOO MUCH ABOUT DIFFERENT THINGS: NOT AT ALL
7. FEELING AFRAID AS IF SOMETHING AWFUL MIGHT HAPPEN: NOT AT ALL
GAD7 TOTAL SCORE: 1

## 2024-01-03 ASSESSMENT — PATIENT HEALTH QUESTIONNAIRE - PHQ9
SUM OF ALL RESPONSES TO PHQ QUESTIONS 1-9: 2
5. POOR APPETITE OR OVEREATING: NOT AT ALL

## 2024-01-03 NOTE — PROGRESS NOTES
Answers for HPI/ROS submitted by the patient on 2023  Frequency of exercise:: 2-3 days/week  Getting at least 3 servings of Calcium per day:: Yes  Diet:: Regular (no restrictions)  Taking medications regularly:: Yes  Bi-annual eye exam:: NO  Dental care twice a year:: Yes  Sleep apnea or symptoms of sleep apnea:: None  Additional concerns today:: Yes  Duration of exercise:: 30-45 minutes         ANNUAL WELLNESS EXAM     Today's Date: Toby 3, 2024     Patient Afua Galvan 1980 presents to the clinic today for a preventative health visit.  Since our last visit she has had a total nephrectomy, due to chronic infections and kidney stones.  She is doing well with that and has been told she does not need follow up.      Of note, Afua's father  2 years ago and that had been a big part of Afua's life.  She is still grieving but is moving forward.           SUBJECTIVE     History of Present Illness:  She is doing well and just here for health maintenance.  She has had one mammogram since the age of 38, she had a Pap 2 years ago.        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        Family History   Problem Relation Age of Onset     Substance Abuse Mother      Hypertension Mother      Substance Abuse Father      Hypertension Father      Lung Cancer Father      Cancer Father      Hypertension Maternal Grandmother      Cancer Maternal  Grandfather      Hypertension Paternal Grandmother      Diabetes Paternal Grandmother      Coronary Artery Disease No family hx of      Hyperlipidemia No family hx of      Cerebrovascular Disease No family hx of      Breast Cancer No family hx of      Colon Cancer No family hx of      Prostate Cancer No family hx of      Other Cancer No family hx of      Depression No family hx of      Anxiety Disorder No family hx of      Mental Illness No family hx of      Anesthesia Reaction No family hx of      Asthma No family hx of      Osteoporosis No family hx of      Genetic Disorder No family hx of      Thyroid Disease No family hx of      Obesity No family hx of         Do you have a first-degree relative with a history of the following:  A. Cancer of the breast or ovaries - No   B. Heart attack, heart pain, or stroke before the age of 55 - No  C. Unexplained death from drowning or car accident - No  D. Osteoporosis or any other significant bone health concerns - No    Social History     Tobacco Use     Smoking status: Never     Smokeless tobacco: Never   Vaping Use     Vaping Use: Never used   Substance Use Topics     Alcohol use: Yes     Comment: 1-2 maybe     Drug use: No        History   Sexual Activity     Sexual activity: Not Currently     Partners: Male     Birth control/ protection: None             1/2/2024    11:05 AM 1/3/2024    10:53 AM   PHQ   PHQ-9 Total Score 1 2   Q9: Thoughts of better off dead/self-harm past 2 weeks Not at all Not at all        Immunization History   Administered Date(s) Administered     COVID-19 MONOVALENT 12+ (Pfizer) 03/17/2021, 04/07/2021, 01/03/2022     Influenza Vaccine >6 months,quad, PF 09/20/2017     Tetanus 07/30/2014        Health Maintenance Due   Topic Date Due     ADVANCE CARE PLANNING  Never done     HEPATITIS B IMMUNIZATION (1 of 3 - 3-dose series) Never done     COLORECTAL CANCER SCREENING  Never done     HIV SCREENING  Never done     HEPATITIS C SCREENING  Never done  "    DTAP/TDAP/TD IMMUNIZATION (1 - Tdap) 07/31/2014     YEARLY PREVENTIVE VISIT  04/05/2022     HPV TEST  04/05/2022     PAP  04/05/2022     LIPID  02/15/2023     INFLUENZA VACCINE (1) 09/01/2023     COVID-19 Vaccine (4 - 2023-24 season) 09/01/2023      Health Maintenance components reviewed - Seasonal Influenza vaccine status is due & Covid-19 vaccine status is due.  Afua will be following up at [facility] for flu and COVID vaccines.      Diet: in general, a \"healthy\" diet      Exercise: 2-3 days/week for an average of 30-45 minutes      Review of Systems   Constitutional: Negative for chills and fever.   HENT: Negative for congestion, ear pain, hearing loss and sore throat.    Eyes: Negative for pain and visual disturbance.   Respiratory: Negative for cough and shortness of breath.    Cardiovascular: Negative for chest pain, palpitations and peripheral edema.   Gastrointestinal: Negative for abdominal pain, constipation, diarrhea, heartburn, hematochezia and nausea.   Breasts:  Negative for tenderness, breast mass and discharge.   Genitourinary: Positive for pelvic pain. Negative for dysuria, frequency, genital sores, hematuria, urgency, vaginal bleeding and vaginal discharge.   Musculoskeletal: Negative for arthralgias, joint swelling and myalgias.   Skin: Negative for rash.   Neurological: Negative for dizziness, weakness, headaches and paresthesias.   Psychiatric/Behavioral: Negative for mood changes. The patient is nervous/anxious.             OBJECTIVE     BP (!) 138/91   Pulse 74   Temp 97.8  F (36.6  C) (Oral)   Ht 1.661 m (5' 5.4\")   Wt 104.5 kg (230 lb 4.8 oz)   SpO2 99%   BMI 37.86 kg/m         Estimated body mass index is 37.86 kg/m  as calculated from the following:    Height as of this encounter: 1.661 m (5' 5.4\").    Weight as of this encounter: 104.5 kg (230 lb 4.8 oz).    Complete \"Weight Managment Plan\" in the progress note from the Adult Preventative or Medicare smartsets, use phrase " .WEIGHTPLAN, or choose an option from Weight Management Resources smartset below.        Labs:  Lab Results   Component Value Date    WBC 10.2 08/09/2022    HGB 8.2 (L) 08/09/2022    HCT 27.5 (L) 08/09/2022     08/09/2022    CHOL 236 (H) 09/20/2017    TRIG 197 (H) 09/20/2017    HDL 56 09/20/2017     08/17/2023    BUN 15.2 08/17/2023    CO2 22 08/17/2023    TSH 1.51 05/20/2016       Physical Exam  Constitutional:       Appearance: Normal appearance. She is obese.   HENT:      Head: Normocephalic and atraumatic.      Right Ear: Tympanic membrane, ear canal and external ear normal.      Left Ear: Tympanic membrane, ear canal and external ear normal.      Nose: Nose normal.      Mouth/Throat:      Mouth: Mucous membranes are moist.      Pharynx: Oropharynx is clear.   Eyes:      Extraocular Movements: Extraocular movements intact.      Conjunctiva/sclera: Conjunctivae normal.      Pupils: Pupils are equal, round, and reactive to light.   Cardiovascular:      Rate and Rhythm: Normal rate and regular rhythm.      Pulses: Normal pulses.      Heart sounds: Normal heart sounds.   Pulmonary:      Effort: Pulmonary effort is normal.      Breath sounds: Normal breath sounds.   Abdominal:      General: Abdomen is flat. Bowel sounds are normal.      Palpations: Abdomen is soft.   Musculoskeletal:         General: Normal range of motion.      Cervical back: Normal range of motion and neck supple.   Skin:     General: Skin is warm and dry.      Capillary Refill: Capillary refill takes less than 2 seconds.   Neurological:      General: No focal deficit present.      Mental Status: She is alert and oriented to person, place, and time.   Psychiatric:         Mood and Affect: Mood normal.         Behavior: Behavior normal.         Thought Content: Thought content normal.         Judgment: Judgment normal.               ASSESSMENT/PLAN     (Z00.00) Routine history and physical examination of adult  (primary encounter  diagnosis)    Plan: CBC with platelets differential, Comprehensive         metabolic panel, Hemoglobin A1c, Lipid panel         reflex to direct LDL Fasting, TSH with free T4         reflex    (D22.9) Normal mole of skin    Plan: Adult Dermatology  Referral    -Discussed/Reinforced healthy diety, lifestyle, exercise and safety.  -Recommended completion of routine dental and eye exam.  -Lab screenings completed today. Results pending.     Weight management plan: Discussed healthy diet and exercise guidelines     PAP (if applicable): Complete Date of Completion: 2022 Follow-up Recommendation: 2024  CBE (if applicable): Complete Date of Completion: today Follow-up Recommendation: one year  Mammogram (if applicable): Date of Completion: 8/8/2023 Follow-up Recommendation: one year  Cholesterol Screening (if applicable): Complete Date of Completion: today Follow-up Recommendation: as needed  Diabetes Screening (if applicable): Complete Date of Completion: today Follow-up Recommendation: as needed  Thyroid Screening (if applicable): Complete Date of Completion: today Follow-up Recommendation: as needed  Depression Screening (if applicable): Complete Date of Completion: today Follow-up Recommendation: one year    Follow-Up:  Follow up in one year, or sooner if needed.     Patient engaged in their plan of care. Patient verbalized understanding and agreed with the final plan.  AVS printed and given to patient.    Trista Orellana NP   [facility]  [facility] [address] [address] [phone]

## 2024-01-03 NOTE — NURSING NOTE
"ROOM:2  MINDI NIETO    Preferred Name: Afua     How did you hear about us?  Current Patient    43 year old  Chief Complaint   Patient presents with     Physical     Pap, mole check, labs       Blood pressure (!) 138/91, pulse 74, temperature 97.8  F (36.6  C), temperature source Oral, height 1.661 m (5' 5.4\"), weight 104.5 kg (230 lb 4.8 oz), SpO2 99%, not currently breastfeeding. Body mass index is 37.86 kg/m .  BP completed using cuff size:        Patient Active Problem List   Diagnosis     Calculus of right kidney     Xanthogranulomatous pyelonephritis     Anemia     Benign essential hypertension     Non morbid obesity due to excess calories     Cervical high risk HPV (human papillomavirus) test positive     ASCUS with positive high risk HPV cervical     Morbid obesity (H)       Wt Readings from Last 2 Encounters:   01/03/24 104.5 kg (230 lb 4.8 oz)   08/08/22 100.2 kg (220 lb 14.4 oz)     BP Readings from Last 3 Encounters:   01/03/24 (!) 138/91   08/09/22 131/68   08/05/22 129/79       Allergies   Allergen Reactions     Cats      Seasonal Allergies        Current Outpatient Medications   Medication     LORazepam (ATIVAN) 0.5 MG tablet     losartan (COZAAR) 50 MG tablet     metoprolol tartrate (LOPRESSOR) 100 MG tablet     ciprofloxacin (CIPRO) 500 MG tablet     senna-docusate (SENOKOT-S/PERICOLACE) 8.6-50 MG tablet     sulfamethoxazole-trimethoprim (BACTRIM DS) 800-160 MG tablet     No current facility-administered medications for this visit.       Social History     Tobacco Use     Smoking status: Never     Smokeless tobacco: Never   Vaping Use     Vaping Use: Never used   Substance Use Topics     Alcohol use: Yes     Comment: 1-2 maybe     Drug use: No       Honoring Choices - Health Care Directive Guide offered to patient at time of visit.    Health Maintenance Due   Topic Date Due     ADVANCE CARE PLANNING  Never done     HEPATITIS B IMMUNIZATION (1 of 3 - 3-dose series) Never done     COLORECTAL " CANCER SCREENING  Never done     HIV SCREENING  Never done     HEPATITIS C SCREENING  Never done     DTAP/TDAP/TD IMMUNIZATION (1 - Tdap) 07/31/2014     YEARLY PREVENTIVE VISIT  04/05/2022     HPV TEST  04/05/2022     PAP  04/05/2022     LIPID  02/15/2023     INFLUENZA VACCINE (1) 09/01/2023     COVID-19 Vaccine (4 - 2023-24 season) 09/01/2023       Immunization History   Administered Date(s) Administered     COVID-19 MONOVALENT 12+ (Pfizer) 03/17/2021, 04/07/2021, 01/03/2022     Influenza Vaccine >6 months,quad, PF 09/20/2017     Tetanus 07/30/2014       Lab Results   Component Value Date    PAP ASC-US 09/20/2017       Recent Labs   Lab Test 08/17/23  1050 08/09/22  0627 08/04/22  2139 09/20/17  1009 05/20/16  1246   LDL  --   --   --  141* 154*   HDL  --   --   --  56 55   TRIG  --   --   --  197* 181*   CR 0.81 0.64   < > 0.80  --    GFRESTIMATED >90 >90   < > 80  --    GFRESTBLACK  --   --   --  >90  --    POTASSIUM 4.3 4.1   < > 4.0  --    TSH  --   --   --   --  1.51    < > = values in this interval not displayed.           7/29/2022     1:31 PM 5/9/2022     1:11 PM   PHQ-2 ( 1999 Pfizer)   Q1: Little interest or pleasure in doing things 0 0   Q2: Feeling down, depressed or hopeless 0 0   PHQ-2 Score 0 0           1/2/2024    11:05 AM 1/3/2024    10:53 AM   PHQ-9 SCORE   PHQ-9 Total Score MyChart 1 (Minimal depression)    PHQ-9 Total Score 1 2           1/2/2024    11:06 AM 1/3/2024    10:53 AM   MARTITA-7 SCORE   Total Score 3 (minimal anxiety)    Total Score 3 1            No data to display                Ginny Mcelroy    January 3, 2024 11:07 AM

## 2024-01-04 DIAGNOSIS — Z87.42 HISTORY OF OVARIAN CYST: Primary | ICD-10-CM

## 2024-01-04 LAB
ALBUMIN SERPL BCG-MCNC: 4.4 G/DL (ref 3.5–5.2)
ALP SERPL-CCNC: 57 U/L (ref 40–150)
ALT SERPL W P-5'-P-CCNC: 29 U/L (ref 0–50)
ANION GAP SERPL CALCULATED.3IONS-SCNC: 9 MMOL/L (ref 7–15)
AST SERPL W P-5'-P-CCNC: 25 U/L (ref 0–45)
BILIRUB SERPL-MCNC: 0.3 MG/DL
BUN SERPL-MCNC: 12.8 MG/DL (ref 6–20)
CALCIUM SERPL-MCNC: 9.3 MG/DL (ref 8.6–10)
CHLORIDE SERPL-SCNC: 104 MMOL/L (ref 98–107)
CHOLEST SERPL-MCNC: 274 MG/DL
CREAT SERPL-MCNC: 0.75 MG/DL (ref 0.51–0.95)
DEPRECATED HCO3 PLAS-SCNC: 24 MMOL/L (ref 22–29)
EGFRCR SERPLBLD CKD-EPI 2021: >90 ML/MIN/1.73M2
FASTING STATUS PATIENT QL REPORTED: ABNORMAL
GLUCOSE SERPL-MCNC: 118 MG/DL (ref 70–99)
HDLC SERPL-MCNC: 56 MG/DL
LDLC SERPL CALC-MCNC: 183 MG/DL
NONHDLC SERPL-MCNC: 218 MG/DL
POTASSIUM SERPL-SCNC: 4.2 MMOL/L (ref 3.4–5.3)
PROT SERPL-MCNC: 7.5 G/DL (ref 6.4–8.3)
SODIUM SERPL-SCNC: 137 MMOL/L (ref 135–145)
TRIGL SERPL-MCNC: 173 MG/DL
TSH SERPL DL<=0.005 MIU/L-ACNC: 2.28 UIU/ML (ref 0.3–4.2)

## 2024-01-10 ENCOUNTER — ANCILLARY PROCEDURE (OUTPATIENT)
Dept: ULTRASOUND IMAGING | Facility: CLINIC | Age: 44
End: 2024-01-10
Attending: NURSE PRACTITIONER
Payer: COMMERCIAL

## 2024-01-10 DIAGNOSIS — Z87.42 HISTORY OF OVARIAN CYST: ICD-10-CM

## 2024-01-10 PROCEDURE — 76830 TRANSVAGINAL US NON-OB: CPT

## 2024-01-10 PROCEDURE — 76856 US EXAM PELVIC COMPLETE: CPT

## 2024-01-10 NOTE — PROGRESS NOTES
Afua is a 43 year old who is being evaluated via a billable video visit.      How would you like to obtain your AVS? MyChart  If the video visit is dropped, the invitation should be resent by: Text to cell phone: [phone]  Will anyone else be joining your video visit? No      Subjective   Afua is a 43 year old, presenting for the following health issues:  No chief complaint on file.    The 10-year ASCVD risk score (Mouna CYR, et al., 2019) is: 2.1%    Values used to calculate the score:      Age: 43 years      Sex: Female      Is Non- : No      Diabetic: No      Tobacco smoker: No      Systolic Blood Pressure: 138 mmHg      Is BP treated: Yes      HDL Cholesterol: 56 mg/dL      Total Cholesterol: 274 mg/dL     Afua is here to follow up on her labs and to further discuss her weight.  She states she really feels like she does so well, it is very discouraging to see her high cholesterol and elevated hemoglobin A1C. Her work is difficult in that she travels a lot and this certainly impacts her eating and her exercise.  She will be gone from [place] for months at a time, living in hotels mostly.  She has difficulty losing weight, even though she says she really doesn't eat a lot.      Afua is very focused on her weight and very concerned about this part of her life.    Review of Systems   CONSTITUTIONAL: NEGATIVE for fever, chills, change in weight  ENT/MOUTH: NEGATIVE for ear, mouth and throat problems  RESP: NEGATIVE for significant cough or SOB  CV: NEGATIVE for chest pain, palpitations or peripheral edema      Objective       Vitals:  No vitals were obtained today due to virtual visit.    Physical Exam   GENERAL: Healthy, alert and no distress  EYES: Eyes grossly normal to inspection.  No discharge or erythema, or obvious scleral/conjunctival abnormalities.  RESP: No audible wheeze, cough, or visible cyanosis.  No visible retractions or increased work of breathing.    SKIN: Visible  skin clear. No significant rash, abnormal pigmentation or lesions.  NEURO: Cranial nerves grossly intact.  Mentation and speech appropriate for age.  PSYCH: Mentation appears normal, affect normal/bright, judgement and insight intact, normal speech and appearance well-groomed.    (E66.01,  Z68.37) Class 2 severe obesity with serious comorbidity and body mass index (BMI) of 37.0 to 37.9 in adult, unspecified obesity type (H)  (primary encounter diagnosis)  Comment: We discussed many options, comprehensive weight loss clinic, she would like to try on her own    (R73.03) Prediabetes  Comment: discussed options, we will start a medication  Plan: metformin 500 mg with evening meal  Recheck in 3-6 months  Diet and exercis    (E78.5) Hyperlipidemia LDL goal <100  Comment: discussed options  Plan: Afua will do diet and exercise for now and she will return to clinic when she is in town.      Video-Visit Details    Type of service:  Video Visit   Video Start Time: 1100  Video End Time:1140    Originating Location (pt. Location): Home    Distant Location (provider location):  On-site  Platform used for Video Visit: [employer]

## 2024-01-11 ENCOUNTER — VIRTUAL VISIT (OUTPATIENT)
Dept: FAMILY MEDICINE | Facility: CLINIC | Age: 44
End: 2024-01-11
Payer: COMMERCIAL

## 2024-01-11 DIAGNOSIS — E66.01 CLASS 2 SEVERE OBESITY WITH SERIOUS COMORBIDITY AND BODY MASS INDEX (BMI) OF 37.0 TO 37.9 IN ADULT, UNSPECIFIED OBESITY TYPE (H): Primary | ICD-10-CM

## 2024-01-11 DIAGNOSIS — E66.812 CLASS 2 SEVERE OBESITY WITH SERIOUS COMORBIDITY AND BODY MASS INDEX (BMI) OF 37.0 TO 37.9 IN ADULT, UNSPECIFIED OBESITY TYPE (H): Primary | ICD-10-CM

## 2024-01-11 DIAGNOSIS — E78.5 HYPERLIPIDEMIA LDL GOAL <100: ICD-10-CM

## 2024-01-11 DIAGNOSIS — R73.03 PREDIABETES: ICD-10-CM

## 2024-01-23 DIAGNOSIS — I10 BENIGN ESSENTIAL HYPERTENSION: ICD-10-CM

## 2024-01-24 ENCOUNTER — TELEPHONE (OUTPATIENT)
Dept: FAMILY MEDICINE | Facility: CLINIC | Age: 44
End: 2024-01-24

## 2024-01-24 NOTE — TELEPHONE ENCOUNTER
Afua is worried about taking Metformin due to the potential side effects on her kidney. The patient is also concerned about unexpected weight changes while taking the medication.

## 2024-01-26 DIAGNOSIS — I10 BENIGN ESSENTIAL HYPERTENSION: ICD-10-CM

## 2024-01-26 RX ORDER — LOSARTAN POTASSIUM 50 MG/1
50 TABLET ORAL DAILY
Qty: 30 TABLET | Refills: 0 | Status: SHIPPED | OUTPATIENT
Start: 2024-01-26 | End: 2024-03-13

## 2024-01-26 RX ORDER — METOPROLOL TARTRATE 100 MG
100 TABLET ORAL 2 TIMES DAILY
Qty: 60 TABLET | Refills: 0 | Status: SHIPPED | OUTPATIENT
Start: 2024-01-26 | End: 2024-02-28

## 2024-01-29 RX ORDER — METOPROLOL TARTRATE 100 MG
100 TABLET ORAL 2 TIMES DAILY
Qty: 180 TABLET | OUTPATIENT
Start: 2024-01-29

## 2024-01-29 NOTE — TELEPHONE ENCOUNTER
metoprolol tartrate (LOPRESSOR) 100 MG tablet 60 tablet 0 1/26/2024     Should have refills on file. Pharmacy sent message. Rx refill denied    Crissy Kyle RN  P Red Flag Triage/MRT
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
## 2024-02-23 DIAGNOSIS — I10 BENIGN ESSENTIAL HYPERTENSION: ICD-10-CM

## 2024-02-28 RX ORDER — METOPROLOL TARTRATE 100 MG
100 TABLET ORAL 2 TIMES DAILY
Qty: 60 TABLET | Refills: 1 | Status: SHIPPED | OUTPATIENT
Start: 2024-02-28 | End: 2024-03-13

## 2024-02-28 NOTE — TELEPHONE ENCOUNTER
metoprolol tartrate (LOPRESSOR) 100 MG tablet   60 tablet 0 1/26/2024     Last Office Visit : 1-  Future Office visit:  none  Beta-Blockers Protocol [id number]/2024 03:00 PM   Protocol Details Blood pressure under 140/90 in past 12 months     BP Readings from Last 3 Encounters:   01/03/24 (!) 138/91   08/09/22 131/68   08/05/22 129/79

## 2024-03-07 DIAGNOSIS — I10 BENIGN ESSENTIAL HYPERTENSION: ICD-10-CM

## 2024-03-11 ENCOUNTER — MYC MEDICAL ADVICE (OUTPATIENT)
Dept: FAMILY MEDICINE | Facility: CLINIC | Age: 44
End: 2024-03-11

## 2024-03-12 NOTE — TELEPHONE ENCOUNTER
Losartan 50 mg    Last Written Prescription Date:  01/26/24  Last Fill Quantity: 30,   # refills: 0  Last Office Visit : 1/11/24 Virtual & 1/03/24 Office - [facility]  Future Office visit:  None    Routing refill request to provider for review/approval because:   Last blood pressure under 140/90 in past 12 months

## 2024-03-13 DIAGNOSIS — I10 BENIGN ESSENTIAL HYPERTENSION: ICD-10-CM

## 2024-03-13 RX ORDER — LOSARTAN POTASSIUM 50 MG/1
50 TABLET ORAL DAILY
Qty: 90 TABLET | Refills: 1 | Status: SHIPPED | OUTPATIENT
Start: 2024-03-13 | End: 2024-08-28

## 2024-03-13 RX ORDER — METOPROLOL TARTRATE 100 MG
100 TABLET ORAL 2 TIMES DAILY
Qty: 180 TABLET | Refills: 0 | Status: SHIPPED | OUTPATIENT
Start: 2024-03-13 | End: 2024-06-11

## 2024-03-14 RX ORDER — LOSARTAN POTASSIUM 50 MG/1
50 TABLET ORAL DAILY
Qty: 30 TABLET | Refills: 0 | OUTPATIENT
Start: 2024-03-14

## 2024-03-14 NOTE — TELEPHONE ENCOUNTER
losartan (COZAAR) 50 MG tablet 90 tablet 1 3/13/2024     Should have refills on file. Pharmacy sent message. Rx refill denied    Crissy Kyle RN  P Red Flag Triage/MRT

## 2024-03-20 ENCOUNTER — TELEPHONE (OUTPATIENT)
Dept: DERMATOLOGY | Facility: CLINIC | Age: 44
End: 2024-03-20
Payer: COMMERCIAL

## 2024-03-20 NOTE — TELEPHONE ENCOUNTER
Denis Caal, those numbers look good!  Thanks for following up.  I would like you to take them daily and report if the numbers consistently go over 125/85.  I appreciate your follow up.  I hope all is well.      Trista

## 2024-03-20 NOTE — TELEPHONE ENCOUNTER
Patient Contacted  and schedule the following:    Appointment type: New   Provider: Dr. Gregorio  Return date: 7/2/24  Specialty phone number: [phone]

## 2024-04-18 NOTE — TELEPHONE ENCOUNTER
Daily Note     Today's date: 2024  Patient name: RACHNA Pulido  : 1953  MRN: [id number]  Referring provider: Lexi Green MD  Dx:   Encounter Diagnosis     ICD-10-CM    1. Pain in right hip  M25.551       2. Trochanteric bursitis, right hip  M70.61       3. Status post hip surgery  Z98.890           Start Time: 0900  Stop Time: 0955  Total time in clinic (min): 55 minutes    Subjective: Pt notes she tolerated care well last tx with minimal soreness in R LB and R knee   and with out  Hip pain or soreness.       Objective: See treatment diary below. Pt amb with SLC with trunk flexed, non antalgic gait. She requires cueing for upright posture. Pt. Noting mild R LB pain pre ex today.  She completed ex with fatigue of R hip mm  toward end of session following WB training and amb in //bars but noted no pain sx.   LBE attempted post exercise but noted soreness R LB, gluteal region and we therefore stopped and utilized HP post tx to assist with post tx soreness.  Pt did progress with TE of 2 x 10 clam shells and hip abd in L S/L today. STM cont to be helpful to manage pain sx.       Assessment: Tolerated treatment well but muscle fatigue and soreness post tx  limited completion of all tasks.  Pt educated to use stationary bike over weekend as tolerated as she has not tried yet. Patient would benefit from continued PT to achieve goals of care.       Plan: Continue per plan of care.      Precautions: DOS: 2/15/24 (R hip trochanteric bursectomy with gluteus medius repair and IT band transfer), Lumbar surgery, R THR, HTN      Daily Treatment Diary:      Initial Evaluation Date: 24  Compliance 4/16 4/18  3/19  3/21  3/26  3/29  4/2  4/4  4/10  4/11   Visit Number [id number]  3  4  5  6  7  8  9  10   [facility]           Y    Y          4/16 4/18  3/19  3/21  3/26  3/29  4/2  4/4  4/10  4/11   Manual                      R Hip PROM 10m -->  15m  10m  10 m  10m  10m  -     Last Clinic Visit: 7/29/2022 [facility]     "10m                        STM R scar, hip / lumbar soft tissue 10m  10m          10m  -    10m   Ther-Ex                      NuStep Upright bike 10 m L 1  1m sore          begin upright bike-t! Upright bike seat 7 1.3 mi, L1    upright bike L1 seat 7 5 m   Ankle Pumps Hip isometrics 20x 5\" ea flex, abd, add, ext Hip isometrics 20x 5\" ea flex, abd, add, ext  d/c to HEP  -    hip abd sumbax iso H/L and neutral hip 2 x 10, 3\" Hip abd submax iso H/L and neutral hip 20x 3\"  --    hip abd iso hip flexed and neutral 2x10, 3\"   Seated March H/L march 2 x 15 H/L march 2 x 15  H/L  H/L AA 2 x 10  H/L AA 20x   H/L AA 20x H/L 15 x   --       LAQ/SAQ 2 x 15, 3\" 2x10 2# 3\"  SAQ/LAQ 20x   SAQ/LAQ 20x  SAQ 1# 20x3\", LAQ 1#  20x  SAQ   2 # 20x/3\"/LAQ 2# 20x3\" SAQ  3# 20x3\"//LAQ 3# 20x3\"  --    LAQ/SAQ AROM 2 x 10 3\"   QSets St hip 3 way 2 x 10 St hip 3 way 2 x 10  5\"x20  20x5\"  20x5\"  20x5\"  10x10\" St hip 2 ways abd, SLR 20x    st hip 3  flex, abd, ext  20x   ASLRS    4 x 5 reps  5\"x20  20x5  20x5\"  20x5\"  d/c to HEP         Heel Slides A 20x  A 20x     PT guided A on slide board 20x  A slide board 20x  -    AA  20x   Clam shell   2 x 10 ea  5\" x 15 submax  5\"x20 submax  20x5\" submax  20x5\"  20x5\"  -    20x5\"   Hip abd L S/L  Hip abd SLR, clam shell in L S/L- mod A Of PT  2 x 10 ea  AA 2 x 10 ea  5\" x 15 submax  5' x20 subamx  5'x 20  5\"x20  5\" x 20      20x5\"   Reviewed and issued HEP Pt ed on HEP, POC                     Neuro Re-Ed                      Mini squats    2 x 15  2 x 15        15x  -  15x       Lunges with TA             Step ups             Step taps 4\" Alt 1 m HR A -     Weight shifting  as cee onto RLE in // 3m  step taps 4\" 2m HR A W/S F/B WBAT 2m     Weight shifting  F/B, S to S //bars  30x ea      Amb with SLC 100ft. X 2  -   Ther-Act              STS    10x hi low modifed height no UE A  -  10x high low modified height no UE A  10x high low modified height no UE A  -         Amb/gait tr 4 laps fwd and 2 laps " "side step in //bars with UE  A 4 laps fwd only f!   100ft. X 2 50% WB walker use cues upright posture //bars PWB RLE 5 laps cues upright WBAT //bars cues posture, stride length WBAT RLE 6 laps   4 laps posture cues, stride length WBAT RLE          Educ in core stab- TA with BKFOs, PPT, LTR short arc, seated overhead reach with TA       Modalities                      CP R hip CP LB R hip post tx x 10 min HP LB R hip post tx x 10 m  HP L/S post tx 10m HP L/S, R glut post tx CP R hip L/S post tx 10 m -- --  HP x 10 m LB and R hip mild                                           Access Code: [email or web address]  URL: [email or web address]  Date: 03/13/2024  Prepared by: Dorie    Exercises  - Long Sitting Quad Set  - 1 x daily - 7 x weekly - 2 sets - 10 reps - 3\" hold  - Supine Gluteal Sets  - 1 x daily - 7 x weekly - 2 sets - 10 reps - 3\" hold  - Seated Gluteal Sets  - 1 x daily - 7 x weekly - 2 sets - 10 reps - 3\" hold  - Seated Long Arc Quad  - 1 x daily - 7 x weekly - 2 sets - 10 reps - 3\" hold  - Seated March  - 1 x daily - 7 x weekly - 2 sets - 10 reps  - Supine Ankle Pumps  - 1 x daily - 7 x weekly - 2 sets - 10 reps             "

## 2024-06-06 ENCOUNTER — TELEPHONE (OUTPATIENT)
Dept: FAMILY MEDICINE | Facility: CLINIC | Age: 44
End: 2024-06-06

## 2024-06-06 DIAGNOSIS — R30.0 DYSURIA: Primary | ICD-10-CM

## 2024-06-06 NOTE — CONFIDENTIAL NOTE
Patient called requesting a Urine Analysis be ordered so she can then do that at another location then go from there. Would like to be contacted via [employer].    Ginny SOUZA CMA 12:54 PM 6/6/2024

## 2024-06-06 NOTE — PROGRESS NOTES
Afua called to say she took a home uti test and it is nitrite positive.  She would like to do a UA closer to her home.  She will need to do a virtual visit for treatment - phone is fine.

## 2024-06-07 ENCOUNTER — VIRTUAL VISIT (OUTPATIENT)
Dept: FAMILY MEDICINE | Facility: CLINIC | Age: 44
End: 2024-06-07
Payer: COMMERCIAL

## 2024-06-07 ENCOUNTER — LAB (OUTPATIENT)
Dept: LAB | Facility: CLINIC | Age: 44
End: 2024-06-07
Payer: COMMERCIAL

## 2024-06-07 ENCOUNTER — TELEPHONE (OUTPATIENT)
Dept: FAMILY MEDICINE | Facility: CLINIC | Age: 44
End: 2024-06-07

## 2024-06-07 DIAGNOSIS — R10.31 GROIN PAIN, RIGHT: ICD-10-CM

## 2024-06-07 DIAGNOSIS — R30.0 DYSURIA: ICD-10-CM

## 2024-06-07 DIAGNOSIS — R10.31 GROIN PAIN, RIGHT: Primary | ICD-10-CM

## 2024-06-07 LAB
ALBUMIN UR-MCNC: NEGATIVE MG/DL
APPEARANCE UR: ABNORMAL
BACTERIA #/AREA URNS HPF: ABNORMAL /HPF
BILIRUB UR QL STRIP: NEGATIVE
COLOR UR AUTO: YELLOW
GLUCOSE UR STRIP-MCNC: NEGATIVE MG/DL
HGB UR QL STRIP: NEGATIVE
KETONES UR STRIP-MCNC: NEGATIVE MG/DL
LEUKOCYTE ESTERASE UR QL STRIP: ABNORMAL
NITRATE UR QL: NEGATIVE
PH UR STRIP: 6.5 [PH] (ref 5–7)
RBC #/AREA URNS AUTO: ABNORMAL /HPF
SP GR UR STRIP: <=1.005 (ref 1–1.03)
SQUAMOUS #/AREA URNS AUTO: ABNORMAL /LPF
UROBILINOGEN UR STRIP-ACNC: 0.2 E.U./DL
WBC #/AREA URNS AUTO: ABNORMAL /HPF

## 2024-06-07 PROCEDURE — 81001 URINALYSIS AUTO W/SCOPE: CPT

## 2024-06-07 PROCEDURE — 87086 URINE CULTURE/COLONY COUNT: CPT

## 2024-06-07 RX ORDER — NITROFURANTOIN 25; 75 MG/1; MG/1
100 CAPSULE ORAL 2 TIMES DAILY
Qty: 10 CAPSULE | Refills: 0 | Status: SHIPPED | OUTPATIENT
Start: 2024-06-07 | End: 2024-08-28

## 2024-06-07 ASSESSMENT — ANXIETY QUESTIONNAIRES
2. NOT BEING ABLE TO STOP OR CONTROL WORRYING: NOT AT ALL
1. FEELING NERVOUS, ANXIOUS, OR ON EDGE: NOT AT ALL
5. BEING SO RESTLESS THAT IT IS HARD TO SIT STILL: NOT AT ALL
7. FEELING AFRAID AS IF SOMETHING AWFUL MIGHT HAPPEN: NOT AT ALL
GAD7 TOTAL SCORE: 0
6. BECOMING EASILY ANNOYED OR IRRITABLE: NOT AT ALL
IF YOU CHECKED OFF ANY PROBLEMS ON THIS QUESTIONNAIRE, HOW DIFFICULT HAVE THESE PROBLEMS MADE IT FOR YOU TO DO YOUR WORK, TAKE CARE OF THINGS AT HOME, OR GET ALONG WITH OTHER PEOPLE: NOT DIFFICULT AT ALL
3. WORRYING TOO MUCH ABOUT DIFFERENT THINGS: NOT AT ALL
GAD7 TOTAL SCORE: 0

## 2024-06-07 ASSESSMENT — ENCOUNTER SYMPTOMS
FEVER: 0
HEMATURIA: 0
CONSTIPATION: 0
FREQUENCY: 0
FLANK PAIN: 0
DYSURIA: 0
ABDOMINAL PAIN: 0
NAUSEA: 0
VOMITING: 0
CHILLS: 1

## 2024-06-07 ASSESSMENT — PATIENT HEALTH QUESTIONNAIRE - PHQ9: 5. POOR APPETITE OR OVEREATING: NOT AT ALL

## 2024-06-07 NOTE — PROGRESS NOTES
Afua is a 44 year old who is being evaluated via a billable telephone visit.    What phone number would you like to be contacted at? [phone]  How would you like to obtain your AVS? [place]  Originating Location (pt. 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 Patient reports the past 2 days she has had right pelvic/groin pain.  Associated symptoms including feeling flush.  Patient denies trauma to the area.  She is not currently sexually active.  She took a dual Advil/Tylenol which helped her symptoms.  She reports that previous UTIs have presented in a similar fashion and she like to start antibiotic for the weekend.  She went to an [facility] lab earlier today and dropped off a UA.  She denies dysuria, flank pain, frequency, hematuria, right lower quadrant abdominal pain.  She reports a history of uterine fibroids and is not sure if this is related. No other acute concerns/symptoms at time of exam.          Review of Systems   Constitutional:  Positive for chills. Negative for fever.   Gastrointestinal:  Negative for abdominal pain, constipation, nausea and vomiting.   Genitourinary:  Negative for dysuria, flank pain, frequency, hematuria, urgency, vaginal discharge and vaginal pain.   Constitutional, HEENT, cardiovascular, pulmonary, gi and gu systems are negative, except as otherwise noted.    Past Medical History:   Diagnosis Date    Anemia     Anxiety     BMI 32.0-32.9,adult 10/12/2015    Calculus of right kidney 10/06/2015    Cervical high risk HPV (human papillomavirus) test positive 05/20/2016 5/20/16 NIL/+ HR HPV (not 16 or 18)    Hypertension      Xanthogranulomatous pyelonephritis 10/06/2015       Past Surgical History:   Procedure Laterality Date    COMBINED CYSTOSCOPY, RETROGRADES, URETEROSCOPY, INSERT STENT Right 8/10/2015    Procedure: COMBINED CYSTOSCOPY, RETROGRADES, URETEROSCOPY, INSERT STENT;  Surgeon: Radha Garcia MD;  [facility]    DAVINCI NEPHRECTOMY Right 8/8/2022    Procedure: Robotic RIGHT NEPHRECTOMY;  Surgeon: Vahe Orellana MD;  [facility]    LASER HOLMIUM LITHOTRIPSY URETER(S), INSERT STENT, COMBINED Right 10/19/2015    Procedure: COMBINED CYSTOSCOPY, URETEROSCOPY, LASER HOLMIUM LITHOTRIPSY URETER(S), INSERT STENT;  Surgeon: Radha Garcia MD;  [facility]       Family History   Problem Relation Age of Onset    Substance Abuse Mother     Hypertension Mother     Substance Abuse Father     Hypertension Father     Lung Cancer Father     Cancer Father     Hypertension Maternal Grandmother     Cancer Maternal Grandfather     Hypertension Paternal Grandmother     Diabetes Paternal Grandmother     Coronary Artery Disease No family hx of     Hyperlipidemia No family hx of     Cerebrovascular Disease No family hx of     Breast Cancer No family hx of     Colon Cancer No family hx of     Prostate Cancer No family hx of     Other Cancer No family hx of     Depression No family hx of     Anxiety Disorder No family hx of     Mental Illness No family hx of     Anesthesia Reaction No family hx of     Asthma No family hx of     Osteoporosis No family hx of     Genetic Disorder No family hx of     Thyroid Disease No family hx of     Obesity No family hx of        Social History     Tobacco Use    Smoking status: Never    Smokeless tobacco: Never   Substance Use Topics    Alcohol use: Yes     Comment: 1-2 maybe               Objective           Vitals:  No vitals were obtained today due to virtual visit.    Physical Exam   General: Alert and no distress //Respiratory: No audible wheeze, cough, or shortness of  breath // Psychiatric:  Appropriate affect, tone, and pace of words      Lab on 06/07/2024   Component Date Value Ref Range Status    Color Urine 06/07/2024 Yellow  Colorless, Straw, Light Yellow, Yellow Final    Appearance Urine 06/07/2024 Slightly Cloudy (A)  Clear Final    Glucose Urine 06/07/2024 Negative  Negative mg/dL Final    Bilirubin Urine 06/07/2024 Negative  Negative Final    Ketones Urine 06/07/2024 Negative  Negative mg/dL Final    Specific Gravity Urine 06/07/2024 <=1.005  1.003 - 1.035 Final    Blood Urine 06/07/2024 Negative  Negative Final    pH Urine 06/07/2024 6.5  5.0 - 7.0 Final    Protein Albumin Urine 06/07/2024 Negative  Negative mg/dL Final    Urobilinogen Urine 06/07/2024 0.2  0.2, 1.0 E.U./dL Final    Nitrite Urine 06/07/2024 Negative  Negative Final    Leukocyte Esterase Urine 06/07/2024 Trace (A)  Negative Final    Bacteria Urine 06/07/2024 Few (A)  None Seen /HPF Final    RBC Urine 06/07/2024 0-2  0-2 /HPF /HPF Final    WBC Urine 06/07/2024 0-5  0-5 /HPF /HPF Final    Squamous Epithelials Urine 06/07/2024 Moderate (A)  None Seen /LPF Final         Assessment & Plan     1. 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  Patient not currently sexually active.  Advil/Tylenol helped her symptoms.  She reports previous UTIs have presented in similar fashion.    Patient's exam is limited by telephone nature of visit. Patient's UA as noted above without nitrites.  There is bacteria and moderate amount of squamous cells.    Explained to the patient that this type of concern is not appropriate for telephone visit and should be addressed in person for physical exam to rule out emergent conditions including acute appendicitis, PID,  etc.  Patient was instructed to go to urgent care [facility].  Patient declined to go as she would prefer empirical antibiotics.    Considering patient's reported history that her previous UTIs have presented in similar fashion, we will initiate a trial of empiric Macrobid and send her urine sample for culture.  Patient was instructed to go to nearest urgent care or emergency department if her symptoms worsen or fail to improve despite antibiotics to which she stated verbal understanding.      - Urine Culture Aerobic Bacterial; Future  - nitroFURantoin macrocrystal-monohydrate (MACROBID) 100 MG capsule; Take 1 capsule (100 mg) by mouth 2 times daily  Dispense: 10 capsule; Refill: 0      Follow-up as needed. Urgent Care/ED precautions as noted above.    All questions/concerns addressed. Patient stated understanding/agreement to plan of care.    NASH Diego, CNP  [employer]    Note: Chart documentation was done in part with Dragon Voice Recognition software.  Although reviewed after completion, some word and grammatical errors may remain. Please contact author for any clarification or concerns.        Phone call duration: 20:04 minutes

## 2024-06-07 NOTE — TELEPHONE ENCOUNTER
Patient called because she left a urine sample for some tests. Those tests are back and she is wondering if a provider can take a peak and let her know if she needs antibiotics. She stated she only has one kidney so it is important that it happens ASAP. She normally sees Trista but she is not in clinic today so I told her I would ask the provider in clinic, Lashell BRIGGS, EMT 1:36 PM 6/7/2024

## 2024-06-08 LAB — BACTERIA UR CULT: NORMAL

## 2024-06-11 ENCOUNTER — TELEPHONE (OUTPATIENT)
Dept: FAMILY MEDICINE | Facility: CLINIC | Age: 44
End: 2024-06-11

## 2024-06-11 ENCOUNTER — MYC REFILL (OUTPATIENT)
Dept: FAMILY MEDICINE | Facility: CLINIC | Age: 44
End: 2024-06-11

## 2024-06-11 DIAGNOSIS — I10 BENIGN ESSENTIAL HYPERTENSION: ICD-10-CM

## 2024-06-11 NOTE — TELEPHONE ENCOUNTER
This patient is waiting for Trista to put in an Ultrasound order. Once the order is in, the patient will schedule an appointment with imaging.

## 2024-06-12 DIAGNOSIS — I10 BENIGN ESSENTIAL HYPERTENSION: ICD-10-CM

## 2024-06-12 DIAGNOSIS — Q50.1 MULTIPLE DEVELOPMENTAL OVARIAN CYSTS: Primary | ICD-10-CM

## 2024-06-12 DIAGNOSIS — D25.9 UTERINE LEIOMYOMA, UNSPECIFIED LOCATION: ICD-10-CM

## 2024-06-12 NOTE — TELEPHONE ENCOUNTER
metoprolol tartrate (LOPRESSOR) 100 MG tablet   180 tablet 0 3/13/2024     Last Office Visit : 5-7-2024  Future Office visit:  none        Beta-Blockers Protocol [id number]/11/2024 08:32 PM   Protocol Details Blood pressure under 140/90 in past 12 months      1/3/2024  11:03 AM 1/3/2024  11:05 AM   Vital Signs     Systolic 144 !  138 !    Diastolic 89 !  91 (H)    Pulse 74  74

## 2024-06-13 RX ORDER — METOPROLOL TARTRATE 100 MG
100 TABLET ORAL 2 TIMES DAILY
Qty: 180 TABLET | Refills: 1 | Status: SHIPPED | OUTPATIENT
Start: 2024-06-13

## 2024-06-20 RX ORDER — METOPROLOL TARTRATE 100 MG
100 TABLET ORAL 2 TIMES DAILY
Qty: 180 TABLET | Refills: 0 | OUTPATIENT
Start: 2024-06-20

## 2024-08-23 ENCOUNTER — TELEPHONE (OUTPATIENT)
Dept: FAMILY MEDICINE | Facility: CLINIC | Age: 44
End: 2024-08-23

## 2024-08-23 NOTE — TELEPHONE ENCOUNTER
"Patient needs a \"STAT\" MRI. Patient is in town on the week of September 2nd and will be leaving on September 8th. She was told by someone from imaging scheduling that she needs your approval in order to secure an appointment during that week.     Afua also mentions that she is having anxiety due to claustrophobia-related thoughts about being in the MRI machine. She wanted to know if you could prescribe something for her to decrease her anxiety during the MRI appointment.   "

## 2024-08-28 DIAGNOSIS — F41.9 ANXIETY DUE TO INVASIVE PROCEDURE: Primary | ICD-10-CM

## 2024-08-28 DIAGNOSIS — I10 BENIGN ESSENTIAL HYPERTENSION: ICD-10-CM

## 2024-08-28 DIAGNOSIS — R93.89 ABNORMAL PELVIC ULTRASOUND: Primary | ICD-10-CM

## 2024-08-28 RX ORDER — DIAZEPAM 5 MG
TABLET ORAL
Qty: 2 TABLET | Refills: 0 | Status: SHIPPED | OUTPATIENT
Start: 2024-08-28

## 2024-08-28 RX ORDER — LOSARTAN POTASSIUM 50 MG/1
50 TABLET ORAL DAILY
Qty: 90 TABLET | Refills: 3 | Status: SHIPPED | OUTPATIENT
Start: 2024-08-28

## 2024-08-30 ENCOUNTER — ANCILLARY PROCEDURE (OUTPATIENT)
Dept: MAMMOGRAPHY | Facility: CLINIC | Age: 44
End: 2024-08-30
Attending: NURSE PRACTITIONER
Payer: COMMERCIAL

## 2024-08-30 DIAGNOSIS — Z12.31 VISIT FOR SCREENING MAMMOGRAM: ICD-10-CM

## 2024-08-30 PROCEDURE — 77067 SCR MAMMO BI INCL CAD: CPT | Performed by: STUDENT IN AN ORGANIZED HEALTH CARE EDUCATION/TRAINING PROGRAM

## 2024-08-30 PROCEDURE — 77063 BREAST TOMOSYNTHESIS BI: CPT | Performed by: STUDENT IN AN ORGANIZED HEALTH CARE EDUCATION/TRAINING PROGRAM

## 2024-09-19 DIAGNOSIS — N83.201 BILATERAL OVARIAN CYSTS: Primary | ICD-10-CM

## 2024-09-19 DIAGNOSIS — N83.202 BILATERAL OVARIAN CYSTS: Primary | ICD-10-CM

## 2024-10-17 ENCOUNTER — DOCUMENTATION ONLY (OUTPATIENT)
Dept: FAMILY MEDICINE | Facility: CLINIC | Age: 44
End: 2024-10-17

## 2024-10-17 NOTE — PROGRESS NOTES
This patient has an appointment with us later today 10/17/2024 and needs orders for a UTI. Please send orders for urine testing. Thank you [facility]

## 2024-10-18 DIAGNOSIS — R39.89 SUSPECTED UTI: Primary | ICD-10-CM

## 2024-10-19 ENCOUNTER — LAB (OUTPATIENT)
Dept: LAB | Facility: CLINIC | Age: 44
End: 2024-10-19
Payer: COMMERCIAL

## 2024-10-19 DIAGNOSIS — R39.89 SUSPECTED UTI: ICD-10-CM

## 2024-10-19 LAB
ALBUMIN UR-MCNC: 100 MG/DL
APPEARANCE UR: CLEAR
BACTERIA #/AREA URNS HPF: ABNORMAL /HPF
BILIRUB UR QL STRIP: ABNORMAL
COLOR UR AUTO: YELLOW
GLUCOSE UR STRIP-MCNC: NEGATIVE MG/DL
HGB UR QL STRIP: NEGATIVE
KETONES UR STRIP-MCNC: ABNORMAL MG/DL
LEUKOCYTE ESTERASE UR QL STRIP: NEGATIVE
NITRATE UR QL: NEGATIVE
PH UR STRIP: 5.5 [PH] (ref 5–7)
RBC #/AREA URNS AUTO: ABNORMAL /HPF
SP GR UR STRIP: 1.02 (ref 1–1.03)
SQUAMOUS #/AREA URNS AUTO: ABNORMAL /LPF
UROBILINOGEN UR STRIP-ACNC: 0.2 E.U./DL
WBC #/AREA URNS AUTO: ABNORMAL /HPF

## 2024-10-19 PROCEDURE — 81001 URINALYSIS AUTO W/SCOPE: CPT

## 2024-11-18 ENCOUNTER — TRANSFERRED RECORDS (OUTPATIENT)
Dept: HEALTH INFORMATION MANAGEMENT | Facility: CLINIC | Age: 44
End: 2024-11-18
Payer: COMMERCIAL

## 2024-11-26 DIAGNOSIS — N83.209 CYST OF OVARY, UNSPECIFIED LATERALITY: ICD-10-CM

## 2024-11-26 DIAGNOSIS — D25.9 UTERINE LEIOMYOMA, UNSPECIFIED LOCATION: Primary | ICD-10-CM

## 2024-12-09 DIAGNOSIS — I10 BENIGN ESSENTIAL HYPERTENSION: ICD-10-CM

## 2024-12-10 RX ORDER — METOPROLOL TARTRATE 100 MG/1
100 TABLET ORAL 2 TIMES DAILY
Qty: 180 TABLET | Refills: 1 | Status: SHIPPED | OUTPATIENT
Start: 2024-12-10

## 2024-12-10 NOTE — TELEPHONE ENCOUNTER
Metoprolol tartrate 100 mg tablet - 1 tablet PO BID          Last Written Prescription Date:  06/13/2024  Last Fill Quantity: 180,   # refills: 1  Last Office Visit : 06/07/2024 - virtual visit  Future Office visit:  No future appointment with FP    Routing refill request to provider for review/approval because:   Most recent blood pressure under 140/90 in past 12 months     01/03/2024 - /91

## 2025-02-22 ENCOUNTER — HEALTH MAINTENANCE LETTER (OUTPATIENT)
Age: 45
End: 2025-02-22

## 2025-07-11 ENCOUNTER — ANCILLARY PROCEDURE (OUTPATIENT)
Dept: GENERAL RADIOLOGY | Facility: CLINIC | Age: 45
End: 2025-07-11
Payer: MEDICAID

## 2025-07-11 DIAGNOSIS — M79.672 LEFT FOOT PAIN: ICD-10-CM

## 2025-07-11 PROCEDURE — 73630 X-RAY EXAM OF FOOT: CPT | Mod: TC | Performed by: RADIOLOGY

## 2025-07-13 ENCOUNTER — RESULTS FOLLOW-UP (OUTPATIENT)
Dept: INTERNAL MEDICINE | Facility: CLINIC | Age: 45
End: 2025-07-13
Payer: MEDICAID

## 2025-07-14 ENCOUNTER — PATIENT OUTREACH (OUTPATIENT)
Dept: CARE COORDINATION | Facility: CLINIC | Age: 45
End: 2025-07-14
Payer: MEDICAID

## 2025-07-16 ENCOUNTER — PATIENT OUTREACH (OUTPATIENT)
Dept: CARE COORDINATION | Facility: CLINIC | Age: 45
End: 2025-07-16
Payer: MEDICAID

## 2025-07-17 ENCOUNTER — OFFICE VISIT (OUTPATIENT)
Dept: PODIATRY | Facility: CLINIC | Age: 45
End: 2025-07-17
Payer: MEDICAID

## 2025-07-17 DIAGNOSIS — M20.42 HAMMERTOE OF LEFT FOOT: ICD-10-CM

## 2025-07-17 DIAGNOSIS — M77.8 CAPSULITIS OF LEFT FOOT: Primary | ICD-10-CM

## 2025-07-17 NOTE — PROGRESS NOTES
Past Medical History:   Diagnosis Date    Anemia     Anxiety     BMI 32.0-32.9,adult 10/12/2015    Calculus of right kidney 10/06/2015    Cervical high risk HPV (human papillomavirus) test positive 05/20/2016 5/20/16 NIL/+ HR HPV (not 16 or 18)    Hypertension     Xanthogranulomatous pyelonephritis 10/06/2015     Patient Active Problem List   Diagnosis    Calculus of right kidney    Xanthogranulomatous pyelonephritis    Anemia    Benign essential hypertension    Non morbid obesity due to excess calories    Cervical high risk HPV (human papillomavirus) test positive    ASCUS with positive high risk HPV cervical    Morbid obesity (H)     Past Surgical History:   Procedure Laterality Date    COMBINED CYSTOSCOPY, RETROGRADES, URETEROSCOPY, INSERT STENT Right 8/10/2015    Procedure: COMBINED CYSTOSCOPY, RETROGRADES, URETEROSCOPY, INSERT STENT;  Surgeon: Radha Garcia MD;  [facility]    DAVINCI NEPHRECTOMY Right 8/8/2022    Procedure: Robotic RIGHT NEPHRECTOMY;  Surgeon: Vahe Orellana MD;  [facility]    LASER HOLMIUM LITHOTRIPSY URETER(S), INSERT STENT, COMBINED Right 10/19/2015    Procedure: COMBINED CYSTOSCOPY, URETEROSCOPY, LASER HOLMIUM LITHOTRIPSY URETER(S), INSERT STENT;  Surgeon: Radha Garcia MD;  [facility]     Social History     Socioeconomic History    Marital status: Single     Spouse name: Not on file    Number of children: Not on file    Years of education: Not on file    Highest education level: Not on file   Occupational History    Not on file   Tobacco Use    Smoking status: Never    Smokeless tobacco: Never   Vaping Use    Vaping status: Never Used   Substance and Sexual Activity    Alcohol use: Yes     Comment: 1-2 maybe    Drug use: No    Sexual activity: Not Currently     Partners: Male     Birth control/protection: None   Other Topics Concern    Parent/sibling w/ CABG, MI or angioplasty before 65F 55M? Not Asked   Social History Narrative    Not on  file     Social Drivers of Health     Financial Resource Strain: Low Risk  (12/27/2023)    Financial Resource Strain     Within the past 12 months, have you or your family members you live with been unable to get utilities (heat, electricity) when it was really needed?: No   Food Insecurity: Low Risk  (12/27/2023)    Food Insecurity     Within the past 12 months, did you worry that your food would run out before you got money to buy more?: No     Within the past 12 months, did the food you bought just not last and you didn t have money to get more?: No   Transportation Needs: Low Risk  (12/27/2023)    Transportation Needs     Within the past 12 months, has lack of transportation kept you from medical appointments, getting your medicines, non-medical meetings or appointments, work, or from getting things that you need?: No   Physical Activity: Not on file   Stress: Not on file   Social Connections: Unknown (12/28/2021)    Received from [facility]    Social Connections     Frequency of Communication with Friends and Family: Not on file   Interpersonal Safety: Low Risk  (7/11/2025)    Interpersonal Safety     Do you feel physically and emotionally safe where you currently live?: Yes     Within the past 12 months, have you been hit, slapped, kicked or otherwise physically hurt by someone?: No     Within the past 12 months, have you been humiliated or emotionally abused in other ways by your partner or ex-partner?: No   Housing Stability: Low Risk  (12/27/2023)    Housing Stability     Do you have housing? : Yes     Are you worried about losing your housing?: No     Family History   Problem Relation Age of Onset    Substance Abuse Mother     Hypertension Mother     Substance Abuse Father     Hypertension Father     Lung Cancer Father     Cancer Father     Hypertension Maternal Grandmother     Cancer Maternal Grandfather     Hypertension Paternal Grandmother     Diabetes Paternal  Grandmother     Coronary Artery Disease No family hx of     Hyperlipidemia No family hx of     Cerebrovascular Disease No family hx of     Breast Cancer No family hx of     Colon Cancer No family hx of     Prostate Cancer No family hx of     Other Cancer No family hx of     Depression No family hx of     Anxiety Disorder No family hx of     Mental Illness No family hx of     Anesthesia Reaction No family hx of     Asthma No family hx of     Osteoporosis No family hx of     Genetic Disorder No family hx of     Thyroid Disease No family hx of     Obesity No family hx of        Subjective findings- 45-year-old presents for left second toe injury.  Relates about 2 months ago stubbed the left second toe and felt was getting better but then was at work in [place] was on her feet a lot and got sore again.  Relates that hurts under the left second MPJ.  Relates she had x-rays done in would like to go over those because they saw some spur on the heels.  Relates she has had plantar fasciitis in the past and did acupuncture and chiropractor treatment which resolved it.    Objective findings- DP and PT are 2-4 left.  Has dorsally contracted digits left foot.  Has mild laterally deviated hallux and functional hallux limitus left foot.  Has pain on palpation of the plantar left second MPJ.  There is no palpable click or shooting pain interspace with pinch squeeze test.  There is no erythema, no drainage, no odor, no calor, no tendon voids left foot.  X-rays 3 views left foot from 7/11/2025 reviewed with patient in clinic today and reviewed report and findings with joint cortical margins intact, bipartite sesamoid, minimal laterally deviated hallux, plantar and posterior calcaneal spurring, nonweightbearing films noted.    Assessment and plan- Capsulitis left second MPJ secondary to injury.  Minesh.  Diagnosis and treatment options discussed with the patient.  Advised her on wearing wide shoe gears which she is doing.  Advised  her on stretching.  Metatarsal pads dispensed to use discussed with her.  Prescription for Voltaren gel given and use discussed with her.  Previous notes reviewed.  Return to clinic and see me as needed.                                              Low level of medical decision making.

## 2025-07-17 NOTE — NURSING NOTE
Afua Galvan's chief complaint for this visit includes:  Chief Complaint   Patient presents with    Consult     Left foot pain,      PCP: No Ref-Primary, Physician    Referring Provider:  Referred Self, MD  No address on file    [facility] 06/23/2025 (Approximate)   Data Unavailable     Do you need any medication refills at today's visit? NO    Allergies   Allergen Reactions    Cats     Seasonal Allergies        Yolanda Jovel, LOKESHN

## 2025-07-17 NOTE — LETTER
7/17/2025      Afua Galvan  [address]  [place] [place] [facility] [address]      Dear Colleague,    Thank you for referring your patient, Afua Galvan, to the [facility]. Please see a copy of my visit note below.    Past Medical History:   Diagnosis Date     Anemia      Anxiety      BMI 32.0-32.9,adult 10/12/2015     Calculus of right kidney 10/06/2015     Cervical high risk HPV (human papillomavirus) test positive 05/20/2016 5/20/16 NIL/+ HR HPV (not 16 or 18)     Hypertension      Xanthogranulomatous pyelonephritis 10/06/2015     Patient Active Problem List   Diagnosis     Calculus of right kidney     Xanthogranulomatous pyelonephritis     Anemia     Benign essential hypertension     Non morbid obesity due to excess calories     Cervical high risk HPV (human papillomavirus) test positive     ASCUS with positive high risk HPV cervical     Morbid obesity (H)     Past Surgical History:   Procedure Laterality Date     COMBINED CYSTOSCOPY, RETROGRADES, URETEROSCOPY, INSERT STENT Right 8/10/2015    Procedure: COMBINED CYSTOSCOPY, RETROGRADES, URETEROSCOPY, INSERT STENT;  Surgeon: Radha Garcia MD;  [facility]     DAVINCI NEPHRECTOMY Right 8/8/2022    Procedure: Robotic RIGHT NEPHRECTOMY;  Surgeon: Vahe Orellana MD;  [facility]     LASER HOLMIUM LITHOTRIPSY URETER(S), INSERT STENT, COMBINED Right 10/19/2015    Procedure: COMBINED CYSTOSCOPY, URETEROSCOPY, LASER HOLMIUM LITHOTRIPSY URETER(S), INSERT STENT;  Surgeon: Radha Garcia MD;  [facility]     Social History     Socioeconomic History     Marital status: Single     Spouse name: Not on file     Number of children: Not on file     Years of education: Not on file     Highest education level: Not on file   Occupational History     Not on file   Tobacco Use     Smoking status: Never     Smokeless tobacco: Never   Vaping Use     Vaping status: Never Used   Substance and Sexual Activity      Alcohol use: Yes     Comment: 1-2 maybe     Drug use: No     Sexual activity: Not Currently     Partners: Male     Birth control/protection: None   Other Topics Concern     Parent/sibling w/ CABG, MI or angioplasty before 65F 55M? Not Asked   Social History Narrative     Not on file     Social Drivers of Health     Financial Resource Strain: Low Risk  (12/27/2023)    Financial Resource Strain      Within the past 12 months, have you or your family members you live with been unable to get utilities (heat, electricity) when it was really needed?: No   Food Insecurity: Low Risk  (12/27/2023)    Food Insecurity      Within the past 12 months, did you worry that your food would run out before you got money to buy more?: No      Within the past 12 months, did the food you bought just not last and you didn t have money to get more?: No   Transportation Needs: Low Risk  (12/27/2023)    Transportation Needs      Within the past 12 months, has lack of transportation kept you from medical appointments, getting your medicines, non-medical meetings or appointments, work, or from getting things that you need?: No   Physical Activity: Not on file   Stress: Not on file   Social Connections: Unknown (12/28/2021)    Received from [facility] [employer] [facility]    Social Connections      Frequency of Communication with Friends and Family: Not on file   Interpersonal Safety: Low Risk  (7/11/2025)    Interpersonal Safety      Do you feel physically and emotionally safe where you currently live?: Yes      Within the past 12 months, have you been hit, slapped, kicked or otherwise physically hurt by someone?: No      Within the past 12 months, have you been humiliated or emotionally abused in other ways by your partner or ex-partner?: No   Housing Stability: Low Risk  (12/27/2023)    Housing Stability      Do you have housing? : Yes      Are you worried about losing your housing?: No     Family History   Problem Relation Age  of Onset     Substance Abuse Mother      Hypertension Mother      Substance Abuse Father      Hypertension Father      Lung Cancer Father      Cancer Father      Hypertension Maternal Grandmother      Cancer Maternal Grandfather      Hypertension Paternal Grandmother      Diabetes Paternal Grandmother      Coronary Artery Disease No family hx of      Hyperlipidemia No family hx of      Cerebrovascular Disease No family hx of      Breast Cancer No family hx of      Colon Cancer No family hx of      Prostate Cancer No family hx of      Other Cancer No family hx of      Depression No family hx of      Anxiety Disorder No family hx of      Mental Illness No family hx of      Anesthesia Reaction No family hx of      Asthma No family hx of      Osteoporosis No family hx of      Genetic Disorder No family hx of      Thyroid Disease No family hx of      Obesity No family hx of        Subjective findings- 45-year-old presents for left second toe injury.  Relates about 2 months ago stubbed the left second toe and felt was getting better but then was at work in [place] was on her feet a lot and got sore again.  Relates that hurts under the left second MPJ.  Relates she had x-rays done in would like to go over those because they saw some spur on the heels.  Relates she has had plantar fasciitis in the past and did acupuncture and chiropractor treatment which resolved it.    Objective findings- DP and PT are 2-4 left.  Has dorsally contracted digits left foot.  Has mild laterally deviated hallux and functional hallux limitus left foot.  Has pain on palpation of the plantar left second MPJ.  There is no palpable click or shooting pain interspace with pinch squeeze test.  There is no erythema, no drainage, no odor, no calor, no tendon voids left foot.  X-rays 3 views left foot from 7/11/2025 reviewed with patient in clinic today and reviewed report and findings with joint cortical margins intact, bipartite sesamoid, minimal  laterally deviated hallux, plantar and posterior calcaneal spurring, nonweightbearing films noted.    Assessment and plan- Capsulitis left second MPJ secondary to injury.  [place]ammertoes.  Diagnosis and treatment options discussed with the patient.  Advised her on wearing wide shoe gears which she is doing.  Advised her on stretching.  Metatarsal pads dispensed to use discussed with her.  Prescription for Voltaren gel given and use discussed with her.  Previous notes reviewed.  Return to clinic and see me as needed.                                              Low level of medical decision making.    Again, thank you for allowing me to participate in the care of your patient.        Sincerely,        Steve Jean-Baptiste DPM    Electronically signed

## 2025-07-23 ENCOUNTER — MYC REFILL (OUTPATIENT)
Dept: INTERNAL MEDICINE | Facility: CLINIC | Age: 45
End: 2025-07-23
Payer: MEDICAID

## 2025-07-23 DIAGNOSIS — I10 BENIGN ESSENTIAL HYPERTENSION: ICD-10-CM

## 2025-07-24 RX ORDER — METOPROLOL TARTRATE 100 MG/1
100 TABLET ORAL 2 TIMES DAILY
Qty: 180 TABLET | Refills: 3 | Status: SHIPPED | OUTPATIENT
Start: 2025-07-24

## 2025-08-19 ENCOUNTER — MYC REFILL (OUTPATIENT)
Dept: FAMILY MEDICINE | Facility: CLINIC | Age: 45
End: 2025-08-19
Payer: MEDICAID

## 2025-08-19 ENCOUNTER — MYC MEDICAL ADVICE (OUTPATIENT)
Dept: INTERNAL MEDICINE | Facility: CLINIC | Age: 45
End: 2025-08-19
Payer: MEDICAID

## 2025-08-19 DIAGNOSIS — I10 BENIGN ESSENTIAL HYPERTENSION: ICD-10-CM

## 2025-08-19 DIAGNOSIS — Z12.11 SCREEN FOR COLON CANCER: ICD-10-CM

## 2025-08-19 RX ORDER — LOSARTAN POTASSIUM 50 MG/1
50 TABLET ORAL DAILY
Qty: 14 TABLET | Refills: 0 | Status: SHIPPED | OUTPATIENT
Start: 2025-08-19 | End: 2025-08-20

## 2025-08-20 RX ORDER — LOSARTAN POTASSIUM 50 MG/1
50 TABLET ORAL DAILY
Qty: 90 TABLET | Refills: 3 | Status: SHIPPED | OUTPATIENT
Start: 2025-08-20

## 2025-08-25 ENCOUNTER — TELEPHONE (OUTPATIENT)
Dept: GASTROENTEROLOGY | Facility: CLINIC | Age: 45
End: 2025-08-25
Payer: MEDICAID

## 2025-08-26 ENCOUNTER — TELEPHONE (OUTPATIENT)
Dept: GASTROENTEROLOGY | Facility: CLINIC | Age: 45
End: 2025-08-26
Payer: MEDICAID

## 2025-08-27 DIAGNOSIS — I10 BENIGN ESSENTIAL HYPERTENSION: ICD-10-CM

## 2025-08-27 RX ORDER — LOSARTAN POTASSIUM 50 MG/1
50 TABLET ORAL DAILY
Qty: 90 TABLET | Refills: 3 | OUTPATIENT
Start: 2025-08-27 | End: 2025-09-10

## 2025-08-27 RX ORDER — METOPROLOL TARTRATE 100 MG/1
TABLET ORAL
Qty: 180 TABLET | Refills: 3 | OUTPATIENT
Start: 2025-08-27

## (undated) DEVICE — ADH SKIN CLOSURE PREMIERPRO EXOFIN 1.0ML 3470

## (undated) DEVICE — DRAPE IOBAN INCISE 13X13" 6640EZ

## (undated) DEVICE — PACK GOWN 3/PK DISP XL SBA32GPFCB

## (undated) DEVICE — CLIP HORIZON LG ORANGE 004200

## (undated) DEVICE — GLOVE PROTEXIS W/NEU-THERA 8.0  2D73TE80

## (undated) DEVICE — TUBING FILTER TRI-LUMEN AIRSEAL ASC-EVAC1

## (undated) DEVICE — ENDO POUCH UNIVERSAL RETRIEVAL SYSTEM INZII 12/15MM CD004

## (undated) DEVICE — DRAPE U SPLIT 74X120" 29440

## (undated) DEVICE — DRAPE SHEET REV FOLD 3/4 9349

## (undated) DEVICE — STRAP UNIVERSAL POSITIONING 2-PIECE 4X47X76" 91-287

## (undated) DEVICE — SU PROLENE 4-0 SHDA 36" 8521H

## (undated) DEVICE — GLOVE PROTEXIS W/NEU-THERA 8.5  2D73TE85

## (undated) DEVICE — CLIP ENDO HEMO-LOC PURPLE LG 544240

## (undated) DEVICE — ENDO TROCAR FIRST ENTRY KII FIOS Z-THRD 05X100MM CTF03

## (undated) DEVICE — SUCTION MANIFOLD NEPTUNE 2 SYS 4 PORT 0702-020-000

## (undated) DEVICE — SUCTION IRR STRYKERFLOW II W/TIP 250-070-520

## (undated) DEVICE — SU VICRYL 3-0 SH 27" UND J416H

## (undated) DEVICE — DRAIN JACKSON PRATT RESERVOIR 100ML SU130-1305

## (undated) DEVICE — SU MONOCRYL 4-0 PS-2 27" UND Y426H

## (undated) DEVICE — DRAIN JACKSON PRATT CHANNEL 19FR ROUND HUBLESS SIL JP-2230

## (undated) DEVICE — SU SILK 3-0 TIE 12X30" A304H

## (undated) DEVICE — VESSEL LOOPS YELLOW MAXI 31145694

## (undated) DEVICE — SU VICRYL 0 UR-6 27" J603H

## (undated) DEVICE — DAVINCI HOT SHEARS TIP COVER  400180

## (undated) DEVICE — SU SILK 0 TIE 6X30" A306H

## (undated) DEVICE — LINEN TOWEL PACK X6 WHITE 5487

## (undated) DEVICE — SU PROLENE 4-0 RB-1DA 36" 8557H

## (undated) DEVICE — DRSG PRIMAPORE 02X3" 7133

## (undated) DEVICE — ENDO TROCAR CONMED AIRSEAL BLADELESS 12X120MM IAS12-120LP

## (undated) DEVICE — SOL WATER IRRIG 1000ML BOTTLE 2F7114

## (undated) DEVICE — SURGICEL HEMOSTAT 4X8" 1952

## (undated) DEVICE — SU ETHILON 3-0 PS-1 18" 1663H

## (undated) DEVICE — DAVINCI XI DRAPE ARM 470015

## (undated) DEVICE — SYR 10ML LL W/O NDL 302995

## (undated) DEVICE — NDL INSUFFLATION 13GA 120MM C2201

## (undated) DEVICE — PACK AB HYST II

## (undated) DEVICE — STPL POWERED ECHELON VASC 35MM PVE35A

## (undated) DEVICE — BLADE KNIFE SURG 15 371115

## (undated) DEVICE — DRAPE LEGGINGS CLEAR 8430

## (undated) DEVICE — DEVICE SUTURE PASSER 14GA WECK EFX EFXSP2

## (undated) DEVICE — SU VICRYL 0 TIE 54" J608H

## (undated) DEVICE — ANTIFOG SOLUTION W/FOAM PAD CF-1002

## (undated) DEVICE — SYSTEM LAPAROVUE VISIBILITY LAPVUE10

## (undated) DEVICE — Device

## (undated) DEVICE — DAVINCI XI SEAL UNIVERSAL 5-8MM 470361

## (undated) DEVICE — ESU GROUND PAD ADULT W/CORD E7507

## (undated) DEVICE — SU SILK 2-0 TIE 12X30" A305H

## (undated) DEVICE — PREP CHLORAPREP 26ML TINTED HI-LITE ORANGE 930815

## (undated) DEVICE — LINEN TOWEL PACK X30 5481

## (undated) DEVICE — SU PROLENE 5-0 RB-1DA 36"  8556H

## (undated) DEVICE — DAVINCI XI DRAPE COLUMN 470341

## (undated) RX ORDER — HYDROMORPHONE HYDROCHLORIDE 1 MG/ML
INJECTION, SOLUTION INTRAMUSCULAR; INTRAVENOUS; SUBCUTANEOUS
Status: DISPENSED
Start: 2022-08-08

## (undated) RX ORDER — LIDOCAINE HYDROCHLORIDE 20 MG/ML
INJECTION, SOLUTION EPIDURAL; INFILTRATION; INTRACAUDAL; PERINEURAL
Status: DISPENSED
Start: 2022-08-08

## (undated) RX ORDER — FUROSEMIDE 10 MG/ML
INJECTION INTRAMUSCULAR; INTRAVENOUS
Status: DISPENSED
Start: 2022-04-21

## (undated) RX ORDER — OXYCODONE HYDROCHLORIDE 5 MG/1
TABLET ORAL
Status: DISPENSED
Start: 2022-08-08

## (undated) RX ORDER — BUPIVACAINE HYDROCHLORIDE 2.5 MG/ML
INJECTION, SOLUTION EPIDURAL; INFILTRATION; INTRACAUDAL
Status: DISPENSED
Start: 2022-08-08

## (undated) RX ORDER — FENTANYL CITRATE-0.9 % NACL/PF 10 MCG/ML
PLASTIC BAG, INJECTION (ML) INTRAVENOUS
Status: DISPENSED
Start: 2022-08-08

## (undated) RX ORDER — FENTANYL CITRATE 50 UG/ML
INJECTION, SOLUTION INTRAMUSCULAR; INTRAVENOUS
Status: DISPENSED
Start: 2022-08-08

## (undated) RX ORDER — HEPARIN SODIUM 5000 [USP'U]/.5ML
INJECTION, SOLUTION INTRAVENOUS; SUBCUTANEOUS
Status: DISPENSED
Start: 2022-08-08

## (undated) RX ORDER — CEFAZOLIN SODIUM/WATER 2 G/20 ML
SYRINGE (ML) INTRAVENOUS
Status: DISPENSED
Start: 2022-08-08

## (undated) RX ORDER — CEFAZOLIN SODIUM 1 G/3ML
INJECTION, POWDER, FOR SOLUTION INTRAMUSCULAR; INTRAVENOUS
Status: DISPENSED
Start: 2022-08-08

## (undated) RX ORDER — PROPOFOL 10 MG/ML
INJECTION, EMULSION INTRAVENOUS
Status: DISPENSED
Start: 2022-08-08

## (undated) RX ORDER — DEXAMETHASONE SODIUM PHOSPHATE 4 MG/ML
INJECTION, SOLUTION INTRA-ARTICULAR; INTRALESIONAL; INTRAMUSCULAR; INTRAVENOUS; SOFT TISSUE
Status: DISPENSED
Start: 2022-08-08

## (undated) RX ORDER — ONDANSETRON 2 MG/ML
INJECTION INTRAMUSCULAR; INTRAVENOUS
Status: DISPENSED
Start: 2022-08-08